# Patient Record
Sex: MALE | Race: BLACK OR AFRICAN AMERICAN | Employment: FULL TIME | URBAN - METROPOLITAN AREA
[De-identification: names, ages, dates, MRNs, and addresses within clinical notes are randomized per-mention and may not be internally consistent; named-entity substitution may affect disease eponyms.]

---

## 2018-03-07 ENCOUNTER — OFFICE VISIT (OUTPATIENT)
Dept: FAMILY MEDICINE CLINIC | Facility: CLINIC | Age: 43
End: 2018-03-07

## 2018-03-07 VITALS
HEART RATE: 78 BPM | OXYGEN SATURATION: 98 % | SYSTOLIC BLOOD PRESSURE: 100 MMHG | TEMPERATURE: 98 F | DIASTOLIC BLOOD PRESSURE: 60 MMHG | RESPIRATION RATE: 16 BRPM

## 2018-03-07 DIAGNOSIS — R20.2 PARESTHESIA: Primary | ICD-10-CM

## 2018-03-07 LAB
GLUCOSE BLOOD: 98
TEST STRIP LOT #: NORMAL NUMERIC

## 2018-03-07 PROCEDURE — 82962 GLUCOSE BLOOD TEST: CPT | Performed by: PHYSICIAN ASSISTANT

## 2018-03-07 PROCEDURE — 99202 OFFICE O/P NEW SF 15 MIN: CPT | Performed by: PHYSICIAN ASSISTANT

## 2018-03-07 NOTE — PROGRESS NOTES
CHIEF COMPLAINT:     Patient presents with:  Paresthesias      HPI:   Siri Granados is a 36year old male who presents with complaints of bilateral numbness in his hands and feet. No weakness or pain.   He says for the past few nights he wakes up and both supple, non-tender  LUNGS: clear to auscultation bilaterally without rale, ronchi, wheeze. CARDIO: S1/S2 without murmur  EXTREMITIES: no cyanosis, clubbing or edema  LYMPH:  no lymphadenopathy.     NEURO:  [id number], Speech fluent, Higher functions normal, CN

## 2018-03-19 ENCOUNTER — OFFICE VISIT (OUTPATIENT)
Dept: FAMILY MEDICINE CLINIC | Facility: CLINIC | Age: 43
End: 2018-03-19

## 2018-03-19 VITALS
DIASTOLIC BLOOD PRESSURE: 60 MMHG | TEMPERATURE: 98 F | WEIGHT: 186 LBS | SYSTOLIC BLOOD PRESSURE: 118 MMHG | HEIGHT: 66 IN | HEART RATE: 57 BPM | OXYGEN SATURATION: 99 % | RESPIRATION RATE: 18 BRPM | BODY MASS INDEX: 29.89 KG/M2

## 2018-03-19 DIAGNOSIS — E78.00 HYPERCHOLESTEREMIA: ICD-10-CM

## 2018-03-19 DIAGNOSIS — L74.512 HYPERHIDROSIS OF PALMS AND SOLES: ICD-10-CM

## 2018-03-19 DIAGNOSIS — L74.513 HYPERHIDROSIS OF PALMS AND SOLES: ICD-10-CM

## 2018-03-19 DIAGNOSIS — R20.2 PARESTHESIAS: Primary | ICD-10-CM

## 2018-03-19 PROCEDURE — 99203 OFFICE O/P NEW LOW 30 MIN: CPT | Performed by: FAMILY MEDICINE

## 2018-03-19 NOTE — PROGRESS NOTES
CHIEF COMPLAINT: Patient presents with:  Establish Care: tingling feet and hands; x1 month    HPI:     Janette Centeno is a 36year old male presents for establish care and discuss tingling in hands and feet x 5–6 weeks; he states the left hand and the feet (Active prior to today's visit):    Current Outpatient Prescriptions:  Multiple Vitamins-Minerals (JORGE MULTI MEN OR) Take by mouth.  Disp:  Rfl:        Allergies:  No Known Allergies    PSFH elements reviewed from today and agreed except as otherwise state VISIT  ASSESSMENT/PLAN:   36year old male with    1. Paresthesias  Mostly at night notes suspect due to position possible carpal tunnel and left hand. - VITAMIN X53; Future  - FOLIC ACID SERUM(FOLATE); Future  - COMP METABOLIC PANEL (14);  Future  - TSH+F

## 2018-03-19 NOTE — PATIENT INSTRUCTIONS
Paraesthesias  Paraesthesia is a burning or prickling sensation that is sometimes felt in the hands, arms, legs or feet. It can also occur in other parts of the body. It can also feel like tingling or numbness, skin crawling, or itching.  The feeling is n Call your healthcare provider right away if any of the following occur:  · Numbness or weakness of the face, one arm, or one leg  · Slurred speech, confusion, trouble speaking, walking, or seeing  · Severe headache, fainting spell, dizziness, or seizure  · · Antiperspirant. An antiperspirant that has 10% to 15% aluminum chloride can block sweat glands and help stop sweating. It comes in creams, sticks, gels, and sprays. You can buy antiperspirant at a drugstore.  Or your healthcare provider may give you a str You may have skin problems in the areas where you sweat. The skin may become moist, pale, swollen, and soft enough to rub away easily. This is known as skin maceration. It can lead to loss of skin, pain, and skin infection.  You can help prevent this proble Don’t just use your thumb and index finger to grasp or lift. This can put stress on your wrist. When you can, use your whole hand and all its fingers to grasp an object.   Minimize repetition  Don’t move your arms or hands or hold an object in the same way Doctors aren’t entirely clear why the condition occurs. Certain things may make a person more likely to have it.  These include:  · Being female  · Being pregnant  · Being overweight  · Having diabetes or rheumatoid arthritis  Symptoms of carpal tunnel synd © 5576-3331 [address]. [address] [facility] [place], [address] [place]. All rights reserved. This information is not intended as a substitute for professional medical care. Always follow your healthcare professional's instructions.         Brandy Mckeon Call your healthcare provider right away if you have any of the following:  · A splint, cast, or dressing that has gotten wet  · Increased bleeding or drainage from the incision (cut)  · Opening of the incision  · Fever of 100.4°F (38°C) or higher, or as d

## 2018-03-20 ENCOUNTER — NURSE ONLY (OUTPATIENT)
Dept: FAMILY MEDICINE CLINIC | Facility: CLINIC | Age: 43
End: 2018-03-20

## 2018-03-20 PROBLEM — E78.00 HYPERCHOLESTEREMIA: Status: ACTIVE | Noted: 2018-03-20

## 2018-03-20 PROCEDURE — 82746 ASSAY OF FOLIC ACID SERUM: CPT | Performed by: FAMILY MEDICINE

## 2018-03-20 PROCEDURE — 84439 ASSAY OF FREE THYROXINE: CPT | Performed by: FAMILY MEDICINE

## 2018-03-20 PROCEDURE — 84443 ASSAY THYROID STIM HORMONE: CPT | Performed by: FAMILY MEDICINE

## 2018-03-20 PROCEDURE — 82607 VITAMIN B-12: CPT | Performed by: FAMILY MEDICINE

## 2018-03-20 PROCEDURE — 80053 COMPREHEN METABOLIC PANEL: CPT | Performed by: FAMILY MEDICINE

## 2018-03-20 PROCEDURE — 36415 COLL VENOUS BLD VENIPUNCTURE: CPT | Performed by: FAMILY MEDICINE

## 2018-03-20 PROCEDURE — 80061 LIPID PANEL: CPT | Performed by: FAMILY MEDICINE

## 2018-04-02 ENCOUNTER — OFFICE VISIT (OUTPATIENT)
Dept: FAMILY MEDICINE CLINIC | Facility: CLINIC | Age: 43
End: 2018-04-02

## 2018-04-02 VITALS
WEIGHT: 184 LBS | HEIGHT: 65.75 IN | SYSTOLIC BLOOD PRESSURE: 110 MMHG | BODY MASS INDEX: 29.93 KG/M2 | DIASTOLIC BLOOD PRESSURE: 62 MMHG | HEART RATE: 64 BPM

## 2018-04-02 DIAGNOSIS — E78.00 HYPERCHOLESTEREMIA: ICD-10-CM

## 2018-04-02 DIAGNOSIS — R20.2 PARESTHESIAS: Primary | ICD-10-CM

## 2018-04-02 PROCEDURE — 99213 OFFICE O/P EST LOW 20 MIN: CPT | Performed by: FAMILY MEDICINE

## 2018-04-02 NOTE — PROGRESS NOTES
CHIEF COMPLAINT: Patient presents with:  Lab Results  Numbness: B/L hands (worse in R hand than L hand, but improved overall - occurs less frequently) & L foot (improved)    HPI:     Alexandra Morel is a 36year old male presents for discuss labs,    States yesterday but rarely eats meat. Mostly vegetables. Moderate beans. Denies any headaches, weakness in the arms or legs, dizziness, speech difficulties, breathing issues, neck or back problems. Denies any pain in this spinal column.   Right hand dominant supple,no adenopathy,no bruits, no JVD  LUNGS: clear to auscultation bilateral. No RRW.  Good inspiratory and expiratory efferot  CARDIO: RRR without murmur, clear S1, S2  VS: no carotid artery or abdominal aorta bruit,    GI: good BS's,no masses,No HSM or awkward position causing numbness that resolves in the morning. No evidence of B12 deficiency. CMP is normal, B12 is normal folate is normal  If symptoms worsen or change will return to clinic  - EXT DME WRIST SPLINT from last visit    2.  Hypercholestere

## 2018-04-02 NOTE — PATIENT INSTRUCTIONS
· Increase omega 3's and alpha linoleic acid (ALA) in diet salmon 2x weekly, walnuts, almonds,flax seed, fatty fish, spinach, titus seeds, grass feed dairy-milk,eggs, rainer lettuce, green beans, shrimp, strawberries and raspberries  · increase exercise to · Cut back on saturated fats and trans fats (also called hydrogenated) by selecting lean cuts of meat, low-fat dairy, and using oils instead of solid fats. Limit baked goods, processed meats, and fried foods.  A diet that’s high in these fats increases your © 1300-1053 [address]. 1407 [facility], [facility] [place]. All rights reserved. This information is not intended as a substitute for professional medical care. Always follow your healthcare professional's instructions.         Lore Arzola Your healthcare provider may recommend that you get more physical activity if you haven't been active. Your provider may recommend that you get moderate to vigorous physical activity for at least 40 minutes each day.  You should do this for at least 3 to 4 Healthy eating and exercise are a good start to keeping your cholesterol down. But you may need some extra help from medicine. If your doctor prescribes medicine, be sure to take it exactly as directed.  Remember:  · Tell your healthcare provider about all If you are in a high-risk group, talk with your healthcare provider about your treatment goals. Make sure you understand why these goals are important, based on your own health history and your family history of heart disease or high cholesterol.   Make a p This handy kitchen appliance cooks food slowly at low temperatures. Set it up in the morning and dinner will be waiting for you when you get home. Soups, stews, and pot roasts all make great crock pot meals. Be sure to trim fat from meat before cooking.  Ex

## 2018-05-12 ENCOUNTER — OFFICE VISIT (OUTPATIENT)
Dept: FAMILY MEDICINE CLINIC | Facility: CLINIC | Age: 43
End: 2018-05-12

## 2018-05-12 VITALS
SYSTOLIC BLOOD PRESSURE: 104 MMHG | WEIGHT: 184 LBS | TEMPERATURE: 98 F | HEIGHT: 67 IN | OXYGEN SATURATION: 99 % | DIASTOLIC BLOOD PRESSURE: 62 MMHG | BODY MASS INDEX: 28.88 KG/M2 | HEART RATE: 97 BPM

## 2018-05-12 DIAGNOSIS — J20.9 ACUTE BRONCHITIS, UNSPECIFIED ORGANISM: Primary | ICD-10-CM

## 2018-05-12 PROCEDURE — 99213 OFFICE O/P EST LOW 20 MIN: CPT | Performed by: PHYSICIAN ASSISTANT

## 2018-05-12 RX ORDER — BENZONATATE 200 MG/1
200 CAPSULE ORAL 3 TIMES DAILY PRN
Qty: 21 CAPSULE | Refills: 0 | Status: SHIPPED | OUTPATIENT
Start: 2018-05-12 | End: 2018-05-19

## 2018-05-12 RX ORDER — METHYLPREDNISOLONE 4 MG/1
TABLET ORAL
Qty: 1 PACKAGE | Refills: 0 | Status: SHIPPED | OUTPATIENT
Start: 2018-05-12 | End: 2018-08-03 | Stop reason: ALTCHOICE

## 2018-05-12 RX ORDER — ALBUTEROL SULFATE 90 UG/1
AEROSOL, METERED RESPIRATORY (INHALATION)
Qty: 1 INHALER | Refills: 0 | Status: SHIPPED | OUTPATIENT
Start: 2018-05-12 | End: 2018-08-03 | Stop reason: ALTCHOICE

## 2018-05-12 NOTE — PATIENT INSTRUCTIONS
1. Medrol- No Motrin like products while on steroid  2. Albuterol  3. Cough suppressant  4. Increase fluids/ rest  5. If fever or worsening symptoms seek treatment  6. Follow up with PCP  Viral Bronchitis (Adult)    You have a viral bronchitis.  Bronchiti · Your appetite may be poor, so a light diet is fine. Avoid dehydration by drinking 6 to 8 glasses of fluids per day (such as water, soft drinks, sports drinks, juices, tea, or soup). Extra fluids will help loosen secretions in the nose and lungs.   · Over-

## 2018-05-12 NOTE — PROGRESS NOTES
CHIEF COMPLAINT:   Patient presents with:  Cough        HPI:   Zohra Newman is a 36year old male who presents for cough for  7  days. Cough is productive and is worse at night when lying down.  Associated symptoms include: Chest congestion, SOB, and whee EYES: EOMI. PERRL. Conjunctiva normal.  Cornea clear. Lid margins normal.  HENT: Atraumatic, normocephalic. TM's clear bilaterally. Nostrils patent, nasal mucosa pink and non-inflamed. No erythema of the throat. NECK: supple, non-tender.   LUNGS: Norm Bronchitis that is caused by a virus is not treated with antibiotics. Instead, medicines may be given to help relieve symptoms. Symptoms can last up to 2 weeks, although the cough may last much longer.   This illness is contagious during the first few days If you are age 72 or older, or if you have a chronic lung disease or condition that affects your immune system, or you smoke, ask your healthcare provider about getting a pneumococcal vaccine and a yearly flu shot (influenza vaccine).   When to seek medical

## 2018-08-03 ENCOUNTER — OFFICE VISIT (OUTPATIENT)
Dept: FAMILY MEDICINE CLINIC | Facility: CLINIC | Age: 43
End: 2018-08-03
Payer: COMMERCIAL

## 2018-08-03 VITALS
BODY MASS INDEX: 28.64 KG/M2 | TEMPERATURE: 98 F | WEIGHT: 174 LBS | SYSTOLIC BLOOD PRESSURE: 108 MMHG | DIASTOLIC BLOOD PRESSURE: 66 MMHG | HEIGHT: 65.25 IN | RESPIRATION RATE: 16 BRPM | HEART RATE: 64 BPM | OXYGEN SATURATION: 97 %

## 2018-08-03 DIAGNOSIS — Z13.21 SCREENING FOR ENDOCRINE, NUTRITIONAL, METABOLIC AND IMMUNITY DISORDER: ICD-10-CM

## 2018-08-03 DIAGNOSIS — Z23 NEED FOR TDAP VACCINATION: ICD-10-CM

## 2018-08-03 DIAGNOSIS — Z13.29 SCREENING FOR ENDOCRINE, NUTRITIONAL, METABOLIC AND IMMUNITY DISORDER: ICD-10-CM

## 2018-08-03 DIAGNOSIS — Z13.228 SCREENING FOR ENDOCRINE, NUTRITIONAL, METABOLIC AND IMMUNITY DISORDER: ICD-10-CM

## 2018-08-03 DIAGNOSIS — Z00.00 ANNUAL PHYSICAL EXAM: Primary | ICD-10-CM

## 2018-08-03 DIAGNOSIS — Z13.0 SCREENING FOR ENDOCRINE, NUTRITIONAL, METABOLIC AND IMMUNITY DISORDER: ICD-10-CM

## 2018-08-03 DIAGNOSIS — Z13.0 SCREENING FOR IRON DEFICIENCY ANEMIA: ICD-10-CM

## 2018-08-03 DIAGNOSIS — E78.00 HYPERCHOLESTEREMIA: ICD-10-CM

## 2018-08-03 LAB
ALBUMIN SERPL-MCNC: 4.3 G/DL (ref 3.5–4.8)
ALBUMIN/GLOB SERPL: 1.3 {RATIO} (ref 1–2)
ALP LIVER SERPL-CCNC: 41 U/L (ref 45–117)
ALT SERPL-CCNC: 31 U/L (ref 17–63)
ANION GAP SERPL CALC-SCNC: 7 MMOL/L (ref 0–18)
AST SERPL-CCNC: 16 U/L (ref 15–41)
BASOPHILS # BLD AUTO: 0.03 X10(3) UL (ref 0–0.1)
BASOPHILS NFR BLD AUTO: 0.9 %
BILIRUB SERPL-MCNC: 1.2 MG/DL (ref 0.1–2)
BUN BLD-MCNC: 10 MG/DL (ref 8–20)
BUN/CREAT SERPL: 8.1 (ref 10–20)
CALCIUM BLD-MCNC: 9.3 MG/DL (ref 8.3–10.3)
CHLORIDE SERPL-SCNC: 106 MMOL/L (ref 101–111)
CHOLEST SMN-MCNC: 227 MG/DL (ref ?–200)
CO2 SERPL-SCNC: 28 MMOL/L (ref 22–32)
CREAT BLD-MCNC: 1.23 MG/DL (ref 0.7–1.3)
EOSINOPHIL # BLD AUTO: 0.03 X10(3) UL (ref 0–0.3)
EOSINOPHIL NFR BLD AUTO: 0.9 %
ERYTHROCYTE [DISTWIDTH] IN BLOOD BY AUTOMATED COUNT: 13.8 % (ref 11.5–16)
GLOBULIN PLAS-MCNC: 3.4 G/DL (ref 2.5–3.7)
GLUCOSE BLD-MCNC: 100 MG/DL (ref 70–99)
HCT VFR BLD AUTO: 43.8 % (ref 37–53)
HDLC SERPL-MCNC: 62 MG/DL (ref 40–59)
HGB BLD-MCNC: 14.5 G/DL (ref 13–17)
IMMATURE GRANULOCYTE COUNT: 0 X10(3) UL (ref 0–1)
IMMATURE GRANULOCYTE RATIO %: 0 %
LDLC SERPL CALC-MCNC: 145 MG/DL (ref ?–100)
LYMPHOCYTES # BLD AUTO: 1.75 X10(3) UL (ref 0.9–4)
LYMPHOCYTES NFR BLD AUTO: 52.4 %
M PROTEIN MFR SERPL ELPH: 7.7 G/DL (ref 6.1–8.3)
MCH RBC QN AUTO: 29.7 PG (ref 27–33.2)
MCHC RBC AUTO-ENTMCNC: 33.1 G/DL (ref 31–37)
MCV RBC AUTO: 89.8 FL (ref 80–99)
MONOCYTES # BLD AUTO: 0.25 X10(3) UL (ref 0.1–1)
MONOCYTES NFR BLD AUTO: 7.5 %
NEUTROPHIL ABS PRELIM: 1.28 X10 (3) UL (ref 1.3–6.7)
NEUTROPHILS # BLD AUTO: 1.28 X10(3) UL (ref 1.3–6.7)
NEUTROPHILS NFR BLD AUTO: 38.3 %
NONHDLC SERPL-MCNC: 165 MG/DL (ref ?–130)
OSMOLALITY SERPL CALC.SUM OF ELEC: 291 MOSM/KG (ref 275–295)
PLATELET # BLD AUTO: 246 10(3)UL (ref 150–450)
POTASSIUM SERPL-SCNC: 4 MMOL/L (ref 3.6–5.1)
RBC # BLD AUTO: 4.88 X10(6)UL (ref 4.3–5.7)
RED CELL DISTRIBUTION WIDTH-SD: 45.3 FL (ref 35.1–46.3)
SODIUM SERPL-SCNC: 141 MMOL/L (ref 136–144)
TRIGL SERPL-MCNC: 98 MG/DL (ref 30–149)
TSI SER-ACNC: 1.03 MIU/ML (ref 0.35–5.5)
VLDLC SERPL CALC-MCNC: 20 MG/DL (ref 0–30)
WBC # BLD AUTO: 3.3 X10(3) UL (ref 4–13)

## 2018-08-03 PROCEDURE — 80050 GENERAL HEALTH PANEL: CPT | Performed by: FAMILY MEDICINE

## 2018-08-03 PROCEDURE — 99396 PREV VISIT EST AGE 40-64: CPT | Performed by: FAMILY MEDICINE

## 2018-08-03 PROCEDURE — 80061 LIPID PANEL: CPT | Performed by: FAMILY MEDICINE

## 2018-08-03 PROCEDURE — 36415 COLL VENOUS BLD VENIPUNCTURE: CPT | Performed by: FAMILY MEDICINE

## 2018-08-03 NOTE — PROGRESS NOTES
Patient presents with:  Physical: fasting labs      REASON FOR VISIT:    Adore Lala is a 36year old male who presents for an [address]     No complaints  , monogamous  On vit D daily + MVI- yes  Calcium- not monitoring  Colonoscop Pneumococcal?: No    Domestic Abuse: No     CAGE:     Cut : No    Annoyed : No    Guilty : No    Eye Opener : No    Scoring  Total Score: 0     Depression Screening (PHQ-2/PHQ-9): Over the LAST 2 WEEKS   Little interest or pleasure in doing things (over th Potassium (mmol/L)   Date Value   03/20/2018 4.2    No flowsheet data found. Creatinine  Annually Creatinine (mg/dL)   Date Value   03/20/2018 1.23    No flowsheet data found.     Digoxin Serum Conc  Annually No results found for: DIGOXIN No flowsheet da pain on exertion  GI: denies abdominal pain, denies heartburn  : denies nocturia or changes in stream  MUSCULOSKELETAL: denies back pain  NEURO: denies headaches  PSYCHE: denies depression or anxiety  HEMATOLOGIC: denies hx of anemia  ENDOCRINE: denies t discussed- CDC recommendation for all  or unmarried couples  - immunizations needs Tdap  -Vitamin D3 2000 units daily recommended  -Needs 1000 mg of calcium daily for osteoporosis prevention discussed  - CBC WITH DIFFERENTIAL WITH PLATELET;  Future

## 2018-08-03 NOTE — PROGRESS NOTES
Patient came in for fasting labs. Patient drawn out of left AC x 1 attempt. Green and lavender tube drawn.

## 2018-08-03 NOTE — PATIENT INSTRUCTIONS
Perform labs fasting 8 hours with water or black coffee or or black tea diet  soda only prior to exam.    -Encourage healthy diet of whole food and avoid processed food and sugary drinks and sodas.   Diet should include lean meats and vegetables including 5 Normal blood pressure is less than 120/80 mm Hg  If your blood pressure is higher than normal, follow the advice of your healthcare provider      Depression All men in this age group At routine exams   Type 2 diabetes or prediabetes All men beginning at ag Haemophilus influenzae Type B (HIB) Men at increased risk for infection – talk with your healthcare provider 1 to 3 doses   Influenza (flu) All men in this age group Once a year   Measles, mumps, rubella (MMR) All men in this age group who have no record o Your body needs calcium to build and repair bones. But it can't make calcium on its own. That's why it's important to eat calcium-rich foods. Some foods are naturally rich in calcium. Others have calcium added (fortified).  It's best to get calcium from the What is this test?  Vitamin D is mainly found in fortified dairy foods, juice, breakfast cereal, and certain fish. This vitamin plays many roles in the body.  But because it helps the body absorb calcium from foods and supplements, it's particularly importa Test results may vary depending on your age, gender, health history, the method used for the test, and other things. Your test results may not mean you have a problem.  Ask your healthcare provider what your test results mean for you.   Children and adults © 8050-0704 [address]. 1407 [facility] [place], [address] [place]. All rights reserved. This information is not intended as a substitute for professional medical care. Always follow your healthcare professional's instructions.         Control · Cut back on saturated fats and trans fats (also called hydrogenated) by selecting lean cuts of meat, low-fat dairy, and using oils instead of solid fats. Limit baked goods, processed meats, and fried foods.  A diet that’s high in these fats increases your © 5754-1616 [address]. 1407 [facility] [place], [facility] [place]. All rights reserved. This information is not intended as a substitute for professional medical care. Always follow your healthcare professional's instructions.

## 2018-08-07 PROBLEM — D70.8 OTHER NEUTROPENIA (HCC): Status: ACTIVE | Noted: 2018-08-07

## 2018-08-07 PROBLEM — D72.810 LYMPHOCYTOPENIA: Status: ACTIVE | Noted: 2018-08-07

## 2018-08-10 ENCOUNTER — TELEPHONE (OUTPATIENT)
Dept: FAMILY MEDICINE CLINIC | Facility: CLINIC | Age: 43
End: 2018-08-10

## 2018-08-10 NOTE — TELEPHONE ENCOUNTER
Spoke with pt, reviewed results and recommendations, pt recently had a respiratory infection and declines further lab work at this time

## 2018-08-10 NOTE — TELEPHONE ENCOUNTER
LMOM to return call to the office. Provided pt office phone [phone] along with office hours given.     Notes recorded by Boogie Jung DO on 8/7/2018 at 9:33 PM CDT  CMP-minimally elevated glucose otherwise normal  CBC-WBC is slightly decreased and

## 2020-11-25 ENCOUNTER — OFFICE VISIT (OUTPATIENT)
Dept: FAMILY MEDICINE CLINIC | Facility: CLINIC | Age: 45
End: 2020-11-25
Payer: COMMERCIAL

## 2020-11-25 VITALS
SYSTOLIC BLOOD PRESSURE: 100 MMHG | RESPIRATION RATE: 18 BRPM | HEIGHT: 66 IN | WEIGHT: 176.81 LBS | TEMPERATURE: 98 F | OXYGEN SATURATION: 100 % | HEART RATE: 74 BPM | BODY MASS INDEX: 28.42 KG/M2 | DIASTOLIC BLOOD PRESSURE: 50 MMHG

## 2020-11-25 DIAGNOSIS — Z12.5 SCREENING FOR PROSTATE CANCER: ICD-10-CM

## 2020-11-25 DIAGNOSIS — Z13.21 SCREENING FOR ENDOCRINE, NUTRITIONAL, METABOLIC AND IMMUNITY DISORDER: ICD-10-CM

## 2020-11-25 DIAGNOSIS — Z00.00 ANNUAL PHYSICAL EXAM: Primary | ICD-10-CM

## 2020-11-25 DIAGNOSIS — Z13.0 SCREENING FOR ENDOCRINE, NUTRITIONAL, METABOLIC AND IMMUNITY DISORDER: ICD-10-CM

## 2020-11-25 DIAGNOSIS — Z13.228 SCREENING FOR ENDOCRINE, NUTRITIONAL, METABOLIC AND IMMUNITY DISORDER: ICD-10-CM

## 2020-11-25 DIAGNOSIS — G89.29 CHRONIC LEFT SHOULDER PAIN: ICD-10-CM

## 2020-11-25 DIAGNOSIS — M25.512 CHRONIC LEFT SHOULDER PAIN: ICD-10-CM

## 2020-11-25 DIAGNOSIS — E78.00 HYPERCHOLESTEREMIA: ICD-10-CM

## 2020-11-25 DIAGNOSIS — Z13.29 SCREENING FOR ENDOCRINE, NUTRITIONAL, METABOLIC AND IMMUNITY DISORDER: ICD-10-CM

## 2020-11-25 DIAGNOSIS — Z13.0 SCREENING FOR IRON DEFICIENCY ANEMIA: ICD-10-CM

## 2020-11-25 PROCEDURE — 3008F BODY MASS INDEX DOCD: CPT | Performed by: FAMILY MEDICINE

## 2020-11-25 PROCEDURE — 3074F SYST BP LT 130 MM HG: CPT | Performed by: FAMILY MEDICINE

## 2020-11-25 PROCEDURE — 99072 ADDL SUPL MATRL&STAF TM PHE: CPT | Performed by: FAMILY MEDICINE

## 2020-11-25 PROCEDURE — 99396 PREV VISIT EST AGE 40-64: CPT | Performed by: FAMILY MEDICINE

## 2020-11-25 PROCEDURE — 3078F DIAST BP <80 MM HG: CPT | Performed by: FAMILY MEDICINE

## 2020-11-25 NOTE — PROGRESS NOTES
Patient presents with:  Physical: not fasting       REASON FOR VISIT:    Adore Lala is a 54year old male who presents for an [address]. Left shoulder in am upon awakening loosens up throughtout he day.  Feels tension anterior shoulder  (H) 03/20/2018     Lab Results   Component Value Date    AST 16 08/03/2018    AST 21 03/20/2018     Lab Results   Component Value Date    ALT 31 08/03/2018    ALT 37 03/20/2018     Lab Results   Component Value Date    TSH 1.030 08/03/2018    TSH 1 with age, risk and gender LDL Cholesterol (mg/dL)   Date Value   08/03/2018 145 (H)       Diabetes Screening  If history of high blood pressure or other  risk factors No results found for: A1C  Glucose (mg/dL)   Date Value   08/03/2018 100 (H)         Josephine file.   No past surgical history on file.    Family History   Problem Relation Age of Onset   • No Known Problems Mother    • No Known Problems Father    • No Known Problems Sister    • No Known Problems Brother    • No Known Problems Brother    • No Known enlargement of prostate no nodules, stool is brown  MUSCULOSKELETAL: back is not tender, FROM of the back.   Left anterior shoulder pain area of tenderness is near [facility] joint superiorly though not appropriate appears well on exam.  Has normal range of motion portion near [facility] joint-complete exercise to evaluate anatomy  Bursitis? Normal range of motion without impingement  Neurovascular intact    4. Screening for prostate cancer  - PSA SCREEN; Future  Risks and benefits of PSA screening discussed.   Uncertain if

## 2020-11-25 NOTE — PATIENT INSTRUCTIONS
As of October 6th 2014, the Drug Enforcement Agency [facility]) is reclassifying all hydrocodone combination medications from Schedule III to Schedule II. This includes medications such as Norco, Vicodin, Lortab, Zohydro, and Vicoprofen.    What this means for yo food and sugary drinks and sodas. Diet should include lean meats and vegetables including 5-7 servings of fruit and vegetables total in 1 day.   Never skip breakfast.  -Encouraged exercise 30 minutes to 60 minutes 5 times daily to prevent obesity and chron heart and lung function  · Reach and maintain a healthy weight  · Make your muscles stronger and more limber so you can stay active  · Prevent falls and fractures by slowing the loss of bone mass (osteoporosis)  · Manage stress better  · Reduce your blood ideas to help you make heart-healthy changes without giving up all the foods and flavors you love. Getting started  · Talk with your healthcare provider about eating plans, such as the DASH or Mediterranean diet. You may also be referred to a dietitian. these foods. Split the other half of your plate between whole grains and lean protein. · Whole grains are high in fiber and rich in vitamins and nutrients. Good choices include whole-wheat bread, pasta, and brown rice.   · Lean proteins give you nutrition [place] [address]. All rights reserved. This information is not intended as a substitute for professional medical care. Always follow your healthcare professional's instructions. What Is Osteoporosis? Osteoporosis is a disease that weakens the bones.  Vira Lee increase everyday safety. Date Last Reviewed: 5/1/2018  © 3275-6404 [employer] [address]. 1407 [facility] [place], [address] [place]. All rights reserved. This information is not intended as a substitute for professional medical care.  Always follow yo Date Last Reviewed: 5/1/2018  © 0454-5326 [employer] [address]. 1407 [facility] [place], [address] [place]. All rights reserved. This information is not intended as a substitute for professional medical care.  Always follow your healthcare Rodolfo Favorite other tests might I have along with this test?  A healthcare provider may also want to check your parathyroid hormone levels and your calcium levels. What do my test results mean?   Test results may vary depending on your age, gender, health history, the supplements you are taking. This includes medicines that don't need a prescription and any illicit drugs you may use. © 0465-1883 [address]. 1407 [facility] [place] [facility] [place], [address] [place] [address]. All rights reserved.  This information is not intended bone fractures and spine changes. It will slow bone loss. Exercise will strengthen your body. It can also be fun. A variety of exercises is best. See below for exercises that can help you.  But before you start, talk with your healthcare provider to be sure

## 2020-12-01 ENCOUNTER — OFFICE VISIT (OUTPATIENT)
Dept: ORTHOPEDICS CLINIC | Facility: CLINIC | Age: 45
End: 2020-12-01
Payer: COMMERCIAL

## 2020-12-01 ENCOUNTER — HOSPITAL ENCOUNTER (OUTPATIENT)
Dept: GENERAL RADIOLOGY | Age: 45
Discharge: HOME | End: 2020-12-01
Attending: ORTHOPAEDIC SURGERY
Payer: COMMERCIAL

## 2020-12-01 VITALS — HEART RATE: 66 BPM | OXYGEN SATURATION: 98 %

## 2020-12-01 DIAGNOSIS — M25.512 ACUTE PAIN OF LEFT SHOULDER: ICD-10-CM

## 2020-12-01 DIAGNOSIS — M25.512 ACUTE PAIN OF LEFT SHOULDER: Primary | ICD-10-CM

## 2020-12-01 PROCEDURE — 73030 X-RAY EXAM OF SHOULDER: CPT | Performed by: ORTHOPAEDIC SURGERY

## 2020-12-01 PROCEDURE — 99203 OFFICE O/P NEW LOW 30 MIN: CPT | Performed by: ORTHOPAEDIC SURGERY

## 2020-12-01 PROCEDURE — 20610 DRAIN/INJ JOINT/BURSA W/O US: CPT | Performed by: ORTHOPAEDIC SURGERY

## 2020-12-01 RX ORDER — TRIAMCINOLONE ACETONIDE 40 MG/ML
40 INJECTION, SUSPENSION INTRA-ARTICULAR; INTRAMUSCULAR ONCE
Status: COMPLETED | OUTPATIENT
Start: 2020-12-01 | End: 2020-12-01

## 2020-12-01 RX ORDER — KETOROLAC TROMETHAMINE 30 MG/ML
30 INJECTION, SOLUTION INTRAMUSCULAR; INTRAVENOUS ONCE
Status: COMPLETED | OUTPATIENT
Start: 2020-12-01 | End: 2020-12-01

## 2020-12-01 RX ADMIN — KETOROLAC TROMETHAMINE 30 MG: 30 INJECTION, SOLUTION INTRAMUSCULAR; INTRAVENOUS at 11:12:00

## 2020-12-01 RX ADMIN — TRIAMCINOLONE ACETONIDE 40 MG: 40 INJECTION, SUSPENSION INTRA-ARTICULAR; INTRAMUSCULAR at 11:12:00

## 2020-12-01 NOTE — PROCEDURES
Left Shoulder Subacromial Space Injection    Name: Andi Sequeira   MRN: [id number]  Date: 12/1/2020     Clinical Indications:   Subacromial impingement with symptoms refractory to conservative measures.      After informed consent, the injection site was mar

## 2020-12-01 NOTE — H&P
[facility] - ORTHOPEDICS  [facility] [address] [address]  [phone]     NEW PATIENT VISIT - HISTORY AND PHYSICAL EXAMINATION     Name: Ryan Perez   MRN: [id number]  Date: 12/1/2020     CC: Left shoulder Endocrine: Negative for cold intolerance and heat intolerance. Genitourinary: Negative for difficulty urinating. Musculoskeletal: Negative for arthralgias, gait problem, joint swelling, myalgias and neck pain. Skin: Negative for rash and wound.    All Atrophy:   none    Scapular winging: Negative    Palpation:     AC Joint  Negative  Biceps Tendon  Negative  Greater Tuberosity Negative    ROM:   Forward Flexion:  full and symmetric  Abduction:   full and symmetric  External Rotation:  full and symmetric I provided him with a subacromial injection with ketorolac and Kenalog, he tolerated the procedure well and noted symptomatic relief immediately. I counseled him to begin activities as tolerated and to follow-up with us on an as-needed basis.   He has no f

## 2020-12-10 ENCOUNTER — TELEPHONE (OUTPATIENT)
Dept: FAMILY MEDICINE CLINIC | Facility: CLINIC | Age: 45
End: 2020-12-10

## 2020-12-10 NOTE — TELEPHONE ENCOUNTER
Received immunization record from [address] [place] [address] to Scan. Per Dr. Loan Stiles:  Needs TDAP- every 10 years. Patient notified via [employer]. Notified also due for flu shot.

## 2021-01-08 ENCOUNTER — TELEPHONE (OUTPATIENT)
Dept: FAMILY MEDICINE CLINIC | Facility: CLINIC | Age: 46
End: 2021-01-08

## 2021-01-08 NOTE — TELEPHONE ENCOUNTER
Spoke to pt. He states he has not gotten his labs done yet. Pt informed he can get his labs done in our office as a nurse visit, or make appt to have done at an [facility] lab. Patient voiced understanding.

## 2021-01-22 ENCOUNTER — NURSE ONLY (OUTPATIENT)
Dept: FAMILY MEDICINE CLINIC | Facility: CLINIC | Age: 46
End: 2021-01-22
Payer: COMMERCIAL

## 2021-01-22 DIAGNOSIS — Z12.5 SCREENING FOR PROSTATE CANCER: ICD-10-CM

## 2021-01-22 DIAGNOSIS — Z13.228 SCREENING FOR ENDOCRINE, NUTRITIONAL, METABOLIC AND IMMUNITY DISORDER: ICD-10-CM

## 2021-01-22 DIAGNOSIS — E78.00 HYPERCHOLESTEREMIA: ICD-10-CM

## 2021-01-22 DIAGNOSIS — Z13.0 SCREENING FOR IRON DEFICIENCY ANEMIA: ICD-10-CM

## 2021-01-22 DIAGNOSIS — Z13.29 SCREENING FOR ENDOCRINE, NUTRITIONAL, METABOLIC AND IMMUNITY DISORDER: ICD-10-CM

## 2021-01-22 DIAGNOSIS — Z00.00 ANNUAL PHYSICAL EXAM: ICD-10-CM

## 2021-01-22 DIAGNOSIS — Z13.21 SCREENING FOR ENDOCRINE, NUTRITIONAL, METABOLIC AND IMMUNITY DISORDER: ICD-10-CM

## 2021-01-22 DIAGNOSIS — Z13.0 SCREENING FOR ENDOCRINE, NUTRITIONAL, METABOLIC AND IMMUNITY DISORDER: ICD-10-CM

## 2021-01-22 LAB
ALBUMIN SERPL-MCNC: 4.3 G/DL (ref 3.4–5)
ALBUMIN/GLOB SERPL: 1.3 {RATIO} (ref 1–2)
ALP LIVER SERPL-CCNC: 52 U/L
ALT SERPL-CCNC: 34 U/L
ANION GAP SERPL CALC-SCNC: 2 MMOL/L (ref 0–18)
AST SERPL-CCNC: 19 U/L (ref 15–37)
BASOPHILS # BLD AUTO: 0.03 X10(3) UL (ref 0–0.2)
BASOPHILS NFR BLD AUTO: 0.8 %
BILIRUB SERPL-MCNC: 1.3 MG/DL (ref 0.1–2)
BUN BLD-MCNC: 16 MG/DL (ref 7–18)
BUN/CREAT SERPL: 10.3 (ref 10–20)
CALCIUM BLD-MCNC: 9.2 MG/DL (ref 8.5–10.1)
CHLORIDE SERPL-SCNC: 108 MMOL/L (ref 98–112)
CHOLEST SMN-MCNC: 285 MG/DL (ref ?–200)
CO2 SERPL-SCNC: 31 MMOL/L (ref 21–32)
COMPLEXED PSA SERPL-MCNC: 2.44 NG/ML (ref ?–4)
CREAT BLD-MCNC: 1.56 MG/DL
DEPRECATED RDW RBC AUTO: 45.1 FL (ref 35.1–46.3)
EOSINOPHIL # BLD AUTO: 0.05 X10(3) UL (ref 0–0.7)
EOSINOPHIL NFR BLD AUTO: 1.3 %
ERYTHROCYTE [DISTWIDTH] IN BLOOD BY AUTOMATED COUNT: 13.3 % (ref 11–15)
GLOBULIN PLAS-MCNC: 3.4 G/DL (ref 2.8–4.4)
GLUCOSE BLD-MCNC: 100 MG/DL (ref 70–99)
HCT VFR BLD AUTO: 44.1 %
HDLC SERPL-MCNC: 65 MG/DL (ref 40–59)
HGB BLD-MCNC: 14.7 G/DL
IMM GRANULOCYTES # BLD AUTO: 0.01 X10(3) UL (ref 0–1)
IMM GRANULOCYTES NFR BLD: 0.3 %
LDLC SERPL CALC-MCNC: 199 MG/DL (ref ?–100)
LYMPHOCYTES # BLD AUTO: 2.19 X10(3) UL (ref 1–4)
LYMPHOCYTES NFR BLD AUTO: 54.8 %
M PROTEIN MFR SERPL ELPH: 7.7 G/DL (ref 6.4–8.2)
MCH RBC QN AUTO: 30.5 PG (ref 26–34)
MCHC RBC AUTO-ENTMCNC: 33.3 G/DL (ref 31–37)
MCV RBC AUTO: 91.5 FL
MONOCYTES # BLD AUTO: 0.32 X10(3) UL (ref 0.1–1)
MONOCYTES NFR BLD AUTO: 8 %
NEUTROPHILS # BLD AUTO: 1.4 X10 (3) UL (ref 1.5–7.7)
NEUTROPHILS # BLD AUTO: 1.4 X10(3) UL (ref 1.5–7.7)
NEUTROPHILS NFR BLD AUTO: 34.8 %
NONHDLC SERPL-MCNC: 220 MG/DL (ref ?–130)
OSMOLALITY SERPL CALC.SUM OF ELEC: 293 MOSM/KG (ref 275–295)
PATIENT FASTING Y/N/NP: YES
PATIENT FASTING Y/N/NP: YES
PLATELET # BLD AUTO: 248 10(3)UL (ref 150–450)
POTASSIUM SERPL-SCNC: 4.4 MMOL/L (ref 3.5–5.1)
RBC # BLD AUTO: 4.82 X10(6)UL
SODIUM SERPL-SCNC: 141 MMOL/L (ref 136–145)
TRIGL SERPL-MCNC: 107 MG/DL (ref 30–149)
TSI SER-ACNC: 1.25 MIU/ML (ref 0.36–3.74)
VLDLC SERPL CALC-MCNC: 21 MG/DL (ref 0–30)
WBC # BLD AUTO: 4 X10(3) UL (ref 4–11)

## 2021-01-22 PROCEDURE — 36415 COLL VENOUS BLD VENIPUNCTURE: CPT | Performed by: FAMILY MEDICINE

## 2021-01-22 PROCEDURE — 80050 GENERAL HEALTH PANEL: CPT | Performed by: FAMILY MEDICINE

## 2021-01-22 PROCEDURE — 84153 ASSAY OF PSA TOTAL: CPT | Performed by: FAMILY MEDICINE

## 2021-01-22 PROCEDURE — 80061 LIPID PANEL: CPT | Performed by: FAMILY MEDICINE

## 2021-01-25 ENCOUNTER — OFFICE VISIT (OUTPATIENT)
Dept: FAMILY MEDICINE CLINIC | Facility: CLINIC | Age: 46
End: 2021-01-25
Payer: COMMERCIAL

## 2021-01-25 VITALS
WEIGHT: 176.81 LBS | HEART RATE: 75 BPM | HEIGHT: 66 IN | SYSTOLIC BLOOD PRESSURE: 118 MMHG | BODY MASS INDEX: 28.42 KG/M2 | OXYGEN SATURATION: 98 % | TEMPERATURE: 98 F | RESPIRATION RATE: 16 BRPM | DIASTOLIC BLOOD PRESSURE: 64 MMHG

## 2021-01-25 DIAGNOSIS — D70.8 OTHER NEUTROPENIA (HCC): ICD-10-CM

## 2021-01-25 DIAGNOSIS — S39.011A STRAIN OF ABDOMINAL MUSCLE, INITIAL ENCOUNTER: Primary | ICD-10-CM

## 2021-01-25 DIAGNOSIS — E78.00 HYPERCHOLESTEREMIA: ICD-10-CM

## 2021-01-25 PROCEDURE — 99214 OFFICE O/P EST MOD 30 MIN: CPT | Performed by: FAMILY MEDICINE

## 2021-01-25 PROCEDURE — 3074F SYST BP LT 130 MM HG: CPT | Performed by: FAMILY MEDICINE

## 2021-01-25 PROCEDURE — 3078F DIAST BP <80 MM HG: CPT | Performed by: FAMILY MEDICINE

## 2021-01-25 PROCEDURE — 3008F BODY MASS INDEX DOCD: CPT | Performed by: FAMILY MEDICINE

## 2021-01-25 RX ORDER — DICLOFENAC SODIUM 75 MG/1
75 TABLET, DELAYED RELEASE ORAL 2 TIMES DAILY
Qty: 20 TABLET | Refills: 0 | Status: SHIPPED | OUTPATIENT
Start: 2021-01-25 | End: 2021-01-27

## 2021-01-25 NOTE — PROGRESS NOTES
CHIEF COMPLAINT: Patient presents with: Other: patient is c/o lower abdominal pain       HPI:     Vincent Almaguer is a 54year old male presents for right lower pelvic wall pain that started 4 weeks ago after a 350 pound dead lift.   Pain was moderate initia • Multiple Vitamins-Minerals (JORGE MULTI MEN OR) 2 scoops in with fruits and vegetables once daily          Allergies:  No Known Allergies    PSFH elements reviewed from today and agreed except as otherwise stated in HPI.   PHYSICAL EXAM:   /64 (BP Lo • Total Protein 01/22/2021 7.7    • Albumin 01/22/2021 4.3    • Globulin  01/22/2021 3.4    • A/G Ratio 01/22/2021 1.3    • FASTING 01/22/2021 Yes    • Cholesterol, Total 01/22/2021 285*   • HDL Cholesterol 01/22/2021 65*   • Triglycerides 01/22/2021 107 No congenital neutropenia given in 2010 was normal  Etiology is unclear but mild suppression is apparent  Repeat CBC in 3-month visit if persists may consider hematology appointment consult    3. Hypercholesteremia  - LIPID PANEL;  Future  The patient's ASC

## 2021-01-26 ENCOUNTER — TELEPHONE (OUTPATIENT)
Dept: FAMILY MEDICINE CLINIC | Facility: CLINIC | Age: 46
End: 2021-01-26

## 2021-01-26 DIAGNOSIS — S39.011A STRAIN OF ABDOMINAL MUSCLE, INITIAL ENCOUNTER: ICD-10-CM

## 2021-01-26 NOTE — TELEPHONE ENCOUNTER
Spoke with pharmacy staff  Sig for diclofenac states that pt should take BID x 21 days, but only 20 pills were sent  Please advise.

## 2021-01-26 NOTE — TELEPHONE ENCOUNTER
Pt called office stating he is having issues with his medication for Inflammation. Pt states the pharmacy has some questions and need to speak to nurse before they give pt his medication.

## 2021-01-27 RX ORDER — DICLOFENAC SODIUM 75 MG/1
75 TABLET, DELAYED RELEASE ORAL 2 TIMES DAILY
Qty: 20 TABLET | Refills: 0 | Status: SHIPPED | OUTPATIENT
Start: 2021-01-27 | End: 2021-02-06

## 2021-01-27 NOTE — TELEPHONE ENCOUNTER
Changed Rx to 10 days. Patient's pain is very mild and may help with inflammation.   Sent new Rx to pharmacy

## 2021-10-15 ENCOUNTER — HOSPITAL ENCOUNTER (OUTPATIENT)
Age: 46
Discharge: HOME | End: 2021-10-15
Payer: COMMERCIAL

## 2021-10-15 VITALS
SYSTOLIC BLOOD PRESSURE: 125 MMHG | HEART RATE: 83 BPM | OXYGEN SATURATION: 99 % | DIASTOLIC BLOOD PRESSURE: 70 MMHG | TEMPERATURE: 100 F | RESPIRATION RATE: 16 BRPM

## 2021-10-15 DIAGNOSIS — Z20.822 ENCOUNTER FOR LABORATORY TESTING FOR COVID-19 VIRUS: Primary | ICD-10-CM

## 2021-10-15 DIAGNOSIS — U07.1 COVID-19: ICD-10-CM

## 2021-10-15 PROCEDURE — 99203 OFFICE O/P NEW LOW 30 MIN: CPT | Performed by: NURSE PRACTITIONER

## 2021-10-15 PROCEDURE — U0002 COVID-19 LAB TEST NON-CDC: HCPCS | Performed by: NURSE PRACTITIONER

## 2021-10-15 NOTE — ED PROVIDER NOTES
Patient Seen in: Immediate [address] [place] [address]      History   Patient presents with:  Loss Of Smell Or Taste  Body ache and/or chills    Stated Complaint: runny nose chills fatigue    Subjective:   41-year-old male presents today with complaints of congestion ru edema present. Mouth/Throat:      Pharynx: Uvula midline. Eyes:      Conjunctiva/sclera: Conjunctivae normal.      Pupils: Pupils are equal, round, and reactive to light. Cardiovascular:      Rate and Rhythm: Normal rate and regular rhythm.    Pulm 1:59 pm    Follow-up:  Dain Portillo DO  [address] [address] [phone]    In 2 weeks  for recheck          Medications Prescribed:  Current Discharge Medication List

## 2021-10-18 ENCOUNTER — TELEPHONE (OUTPATIENT)
Dept: CASE MANAGEMENT | Age: 46
End: 2021-10-18

## 2021-10-19 NOTE — TELEPHONE ENCOUNTER
Patient states he has information given to him from the urgent care about the infusion, he states he wants to read it over and will call me back to schedule. I gave patient my contact information.

## 2021-10-20 ENCOUNTER — TELEPHONE (OUTPATIENT)
Dept: FAMILY MEDICINE CLINIC | Facility: CLINIC | Age: 46
End: 2021-10-20

## 2021-10-20 ENCOUNTER — NURSE ONLY (OUTPATIENT)
Dept: INFUSION CENTER | Age: 46
End: 2021-10-20
Attending: NURSE PRACTITIONER
Payer: COMMERCIAL

## 2021-10-20 VITALS
HEART RATE: 58 BPM | OXYGEN SATURATION: 99 % | TEMPERATURE: 97 F | SYSTOLIC BLOOD PRESSURE: 101 MMHG | RESPIRATION RATE: 21 BRPM | DIASTOLIC BLOOD PRESSURE: 71 MMHG

## 2021-10-20 NOTE — TELEPHONE ENCOUNTER
Pt received PAB infusion at [address] [place] [address] on 10/20 for COVID-19. Please follow-up with pt for post-infusion assessment and home monitoring if needed. Thank you.

## 2021-10-22 ENCOUNTER — TELEMEDICINE (OUTPATIENT)
Dept: FAMILY MEDICINE CLINIC | Facility: CLINIC | Age: 46
End: 2021-10-22
Payer: COMMERCIAL

## 2021-10-22 ENCOUNTER — TELEPHONE (OUTPATIENT)
Dept: FAMILY MEDICINE CLINIC | Facility: CLINIC | Age: 46
End: 2021-10-22

## 2021-10-22 VITALS — WEIGHT: 170 LBS | HEIGHT: 67 IN | BODY MASS INDEX: 26.68 KG/M2

## 2021-10-22 DIAGNOSIS — U07.1 COVID-19: Primary | ICD-10-CM

## 2021-10-22 PROCEDURE — 99213 OFFICE O/P EST LOW 20 MIN: CPT | Performed by: FAMILY MEDICINE

## 2021-10-22 PROCEDURE — 3008F BODY MASS INDEX DOCD: CPT | Performed by: FAMILY MEDICINE

## 2021-10-22 RX ORDER — AMOXICILLIN 500 MG/1
500 CAPSULE ORAL 3 TIMES DAILY
COMMUNITY
Start: 2021-09-07

## 2021-10-22 NOTE — PROGRESS NOTES
Due to COVID-19 ACTION PLAN, the patient's office visit was converted to a video visit.   Time Spent: 15 min    Patient presents with:  Covid: per patient tested for covid on 10/15/2021    Subjective     HPI:   Harry Bo is a 51year old male who presen Patient called back for a status, please call, thanks.    10/15/2021 Detected*             Assessment    Diagnoses and all orders for this visit:    COVID-19  -     SARS-COV-2 BY PCR ([facility]IN[facility]); Future  Doing well.   Patient to remain in isolation in bedroom and terminate tomorrow if minimal to no cough off Shoshana-Se face-to-face examination or emergency care. Patient advised to go to ER or call 911 for worsening symptoms or acute distress. Patient/Caregiver Education: Patient/Caregiver Education: There are no barriers to learning. Medical education done.    Outcome

## 2021-10-22 NOTE — TELEPHONE ENCOUNTER
Pt wants to know if he is consider non contagious at this point due to him testing positive on 10/13/2021.

## 2021-10-22 NOTE — TELEPHONE ENCOUNTER
Spoke to pt and let him know he should be symptom free x 24 hours without use of medication before coming off quarantine  Needs to be on quarantine until tomorrow and would be considered non contagious after tomorrow as long as no symptoms  Patient voiced

## 2021-10-25 ENCOUNTER — LAB ENCOUNTER (OUTPATIENT)
Dept: LAB | Age: 46
End: 2021-10-25
Attending: FAMILY MEDICINE
Payer: COMMERCIAL

## 2021-10-25 DIAGNOSIS — U07.1 COVID-19: ICD-10-CM

## 2021-10-26 ENCOUNTER — TELEPHONE (OUTPATIENT)
Dept: FAMILY MEDICINE CLINIC | Facility: CLINIC | Age: 46
End: 2021-10-26

## 2021-10-26 ENCOUNTER — PATIENT MESSAGE (OUTPATIENT)
Dept: FAMILY MEDICINE CLINIC | Facility: CLINIC | Age: 46
End: 2021-10-26

## 2021-10-26 DIAGNOSIS — Z86.16 HISTORY OF COVID-19: Primary | ICD-10-CM

## 2021-10-26 NOTE — TELEPHONE ENCOUNTER
From: Azam Hogan  To: Dahiana Bean DO  Sent: 10/26/2021 3:21 PM CDT  Subject: Question regarding SARS-COV-2    Hi Dr. Isaac Georges,    I don't have any symptoms however, this test has detected a positive.  When am I eligible for another test?

## 2021-10-27 NOTE — TELEPHONE ENCOUNTER
Spoke to pt, stated he received my-chart message from Dr. Bib White and no longer needs to speak to her.  Will be retesting for Covid  next week

## 2021-10-27 NOTE — TELEPHONE ENCOUNTER
Patient may repeat in 1 week. Patient can test positive for up to 1 month after symptoms have resolved but are not contagious.   This is part of the reason why CDC does not recommend testing after active infection  Patient needs for employer to return to w

## 2021-10-27 NOTE — TELEPHONE ENCOUNTER
Spoke to pt, pt  wanted to know when it was ok to not use mask at home around wife. Pt past 10 days of diagnosis. Pt informed should also be symptom free and afebrile for 24 hours before returning to work, or regular activities.  Pt verbalized understanding

## 2021-11-01 ENCOUNTER — LAB ENCOUNTER (OUTPATIENT)
Dept: LAB | Age: 46
End: 2021-11-01
Attending: FAMILY MEDICINE
Payer: COMMERCIAL

## 2021-11-01 DIAGNOSIS — Z86.16 HISTORY OF COVID-19: ICD-10-CM

## 2021-11-02 ENCOUNTER — PATIENT MESSAGE (OUTPATIENT)
Dept: FAMILY MEDICINE CLINIC | Facility: CLINIC | Age: 46
End: 2021-11-02

## 2021-11-03 DIAGNOSIS — Z86.16 HISTORY OF COVID-19: Primary | ICD-10-CM

## 2021-11-03 NOTE — TELEPHONE ENCOUNTER
From: Alexandra Morel  To: Raúl Montana DO  Sent: 11/2/2021 5:52 PM CDT  Subject: Question regarding SARS-COV-2    Hi Dr. Angelina Sebastian,    Can you write a letter for my travel on Friday?

## 2021-11-03 NOTE — TELEPHONE ENCOUNTER
Pt to travel out of the country on Friday, denies any sick symptoms at this time. Letter for travel gigi due to recent Covid infection 10/15/21. Pt to  copy in office once ready.

## 2021-11-03 NOTE — TELEPHONE ENCOUNTER
Please clarify l with patient isai for travel? I am uncertain of context and why I am writing the letter? If this is clearance to travel, please clarify he remains asymptomatic with Covid symptoms. He is greater than 10 days since diagnosis.     Thank

## 2022-04-18 ENCOUNTER — OFFICE VISIT (OUTPATIENT)
Dept: FAMILY MEDICINE CLINIC | Facility: CLINIC | Age: 47
End: 2022-04-18
Payer: COMMERCIAL

## 2022-04-18 VITALS
HEIGHT: 66.75 IN | SYSTOLIC BLOOD PRESSURE: 104 MMHG | DIASTOLIC BLOOD PRESSURE: 60 MMHG | OXYGEN SATURATION: 98 % | HEART RATE: 56 BPM | TEMPERATURE: 97 F | WEIGHT: 179.63 LBS | RESPIRATION RATE: 14 BRPM | BODY MASS INDEX: 28.19 KG/M2

## 2022-04-18 DIAGNOSIS — E78.00 HYPERCHOLESTEREMIA: ICD-10-CM

## 2022-04-18 DIAGNOSIS — D70.8 OTHER NEUTROPENIA (HCC): ICD-10-CM

## 2022-04-18 DIAGNOSIS — Z23 NEED FOR VACCINATION: ICD-10-CM

## 2022-04-18 DIAGNOSIS — Z00.00 ANNUAL PHYSICAL EXAM: Primary | ICD-10-CM

## 2022-04-18 DIAGNOSIS — Z13.228 SCREENING FOR ENDOCRINE, NUTRITIONAL, METABOLIC AND IMMUNITY DISORDER: ICD-10-CM

## 2022-04-18 DIAGNOSIS — D72.810 LYMPHOCYTOPENIA: ICD-10-CM

## 2022-04-18 DIAGNOSIS — Z13.29 SCREENING FOR ENDOCRINE, NUTRITIONAL, METABOLIC AND IMMUNITY DISORDER: ICD-10-CM

## 2022-04-18 DIAGNOSIS — M25.511 ACUTE PAIN OF RIGHT SHOULDER: ICD-10-CM

## 2022-04-18 DIAGNOSIS — Z13.21 SCREENING FOR ENDOCRINE, NUTRITIONAL, METABOLIC AND IMMUNITY DISORDER: ICD-10-CM

## 2022-04-18 DIAGNOSIS — Z13.0 SCREENING FOR ENDOCRINE, NUTRITIONAL, METABOLIC AND IMMUNITY DISORDER: ICD-10-CM

## 2022-04-18 DIAGNOSIS — Z12.11 SCREEN FOR COLON CANCER: ICD-10-CM

## 2022-04-18 PROBLEM — R20.2 PARESTHESIAS: Status: RESOLVED | Noted: 2018-03-19 | Resolved: 2022-04-18

## 2022-04-18 PROCEDURE — 90715 TDAP VACCINE 7 YRS/> IM: CPT | Performed by: FAMILY MEDICINE

## 2022-04-18 PROCEDURE — 3078F DIAST BP <80 MM HG: CPT | Performed by: FAMILY MEDICINE

## 2022-04-18 PROCEDURE — 90732 PPSV23 VACC 2 YRS+ SUBQ/IM: CPT | Performed by: FAMILY MEDICINE

## 2022-04-18 PROCEDURE — 90472 IMMUNIZATION ADMIN EACH ADD: CPT | Performed by: FAMILY MEDICINE

## 2022-04-18 PROCEDURE — 99396 PREV VISIT EST AGE 40-64: CPT | Performed by: FAMILY MEDICINE

## 2022-04-18 PROCEDURE — 3008F BODY MASS INDEX DOCD: CPT | Performed by: FAMILY MEDICINE

## 2022-04-18 PROCEDURE — 90471 IMMUNIZATION ADMIN: CPT | Performed by: FAMILY MEDICINE

## 2022-04-18 PROCEDURE — 3074F SYST BP LT 130 MM HG: CPT | Performed by: FAMILY MEDICINE

## 2022-04-18 RX ORDER — CHLORAL HYDRATE 500 MG
1000 CAPSULE ORAL DAILY
COMMUNITY

## 2022-04-18 RX ORDER — BIOTIN 1 MG
1 TABLET ORAL DAILY
COMMUNITY

## 2022-04-18 RX ORDER — CELECOXIB 200 MG/1
200 CAPSULE ORAL 2 TIMES DAILY
Qty: 20 CAPSULE | Refills: 0 | Status: SHIPPED | OUTPATIENT
Start: 2022-04-18

## 2023-10-20 ENCOUNTER — PATIENT MESSAGE (OUTPATIENT)
Dept: FAMILY MEDICINE CLINIC | Facility: CLINIC | Age: 48
End: 2023-10-20

## 2023-10-20 ENCOUNTER — OFFICE VISIT (OUTPATIENT)
Dept: FAMILY MEDICINE CLINIC | Facility: CLINIC | Age: 48
End: 2023-10-20
Payer: COMMERCIAL

## 2023-10-20 VITALS
OXYGEN SATURATION: 97 % | BODY MASS INDEX: 27.74 KG/M2 | HEART RATE: 78 BPM | DIASTOLIC BLOOD PRESSURE: 66 MMHG | TEMPERATURE: 98 F | WEIGHT: 172.63 LBS | RESPIRATION RATE: 16 BRPM | HEIGHT: 66 IN | SYSTOLIC BLOOD PRESSURE: 90 MMHG

## 2023-10-20 DIAGNOSIS — Z23 NEED FOR VACCINATION: ICD-10-CM

## 2023-10-20 DIAGNOSIS — D70.8 OTHER NEUTROPENIA (HCC): ICD-10-CM

## 2023-10-20 DIAGNOSIS — Z12.5 SCREENING FOR MALIGNANT NEOPLASM OF PROSTATE: ICD-10-CM

## 2023-10-20 DIAGNOSIS — E78.00 HYPERCHOLESTEREMIA: ICD-10-CM

## 2023-10-20 DIAGNOSIS — M54.16 RIGHT LUMBAR RADICULOPATHY: ICD-10-CM

## 2023-10-20 DIAGNOSIS — Z00.00 ANNUAL PHYSICAL EXAM: Primary | ICD-10-CM

## 2023-10-20 DIAGNOSIS — Z12.11 SCREEN FOR COLON CANCER: ICD-10-CM

## 2023-10-20 DIAGNOSIS — M79.645 FINGER PAIN, LEFT: ICD-10-CM

## 2023-10-20 PROBLEM — M25.512 ACUTE PAIN OF LEFT SHOULDER: Status: RESOLVED | Noted: 2020-12-01 | Resolved: 2023-10-20

## 2023-10-20 PROBLEM — M25.511 ACUTE PAIN OF RIGHT SHOULDER: Status: RESOLVED | Noted: 2022-04-18 | Resolved: 2023-10-20

## 2023-10-20 PROBLEM — U07.1 COVID-19: Status: RESOLVED | Noted: 2021-10-22 | Resolved: 2023-10-20

## 2023-10-20 LAB
ALBUMIN SERPL-MCNC: 3.8 G/DL (ref 3.4–5)
ALBUMIN/GLOB SERPL: 1.1 {RATIO} (ref 1–2)
ALP LIVER SERPL-CCNC: 46 U/L
ALT SERPL-CCNC: 81 U/L
ANION GAP SERPL CALC-SCNC: 4 MMOL/L (ref 0–18)
AST SERPL-CCNC: 34 U/L (ref 15–37)
BASOPHILS # BLD AUTO: 0.02 X10(3) UL (ref 0–0.2)
BASOPHILS NFR BLD AUTO: 0.5 %
BILIRUB SERPL-MCNC: 1.1 MG/DL (ref 0.1–2)
BUN BLD-MCNC: 18 MG/DL (ref 7–18)
CALCIUM BLD-MCNC: 9.3 MG/DL (ref 8.5–10.1)
CHLORIDE SERPL-SCNC: 104 MMOL/L (ref 98–112)
CHOLEST SERPL-MCNC: 253 MG/DL (ref ?–200)
CO2 SERPL-SCNC: 30 MMOL/L (ref 21–32)
COMPLEXED PSA SERPL-MCNC: 2.75 NG/ML (ref ?–4)
CREAT BLD-MCNC: 1.48 MG/DL
EGFRCR SERPLBLD CKD-EPI 2021: 58 ML/MIN/1.73M2 (ref 60–?)
EOSINOPHIL # BLD AUTO: 0.07 X10(3) UL (ref 0–0.7)
EOSINOPHIL NFR BLD AUTO: 1.8 %
ERYTHROCYTE [DISTWIDTH] IN BLOOD BY AUTOMATED COUNT: 12.7 %
ERYTHROCYTE [SEDIMENTATION RATE] IN BLOOD: 6 MM/HR
EST. AVERAGE GLUCOSE BLD GHB EST-MCNC: 114 MG/DL (ref 68–126)
FASTING PATIENT LIPID ANSWER: YES
FASTING STATUS PATIENT QL REPORTED: YES
GLOBULIN PLAS-MCNC: 3.5 G/DL (ref 2.8–4.4)
GLUCOSE BLD-MCNC: 92 MG/DL (ref 70–99)
HBA1C MFR BLD: 5.6 % (ref ?–5.7)
HCT VFR BLD AUTO: 45.4 %
HDLC SERPL-MCNC: 64 MG/DL (ref 40–59)
HGB BLD-MCNC: 15.6 G/DL
IMM GRANULOCYTES # BLD AUTO: 0.01 X10(3) UL (ref 0–1)
IMM GRANULOCYTES NFR BLD: 0.3 %
LDLC SERPL CALC-MCNC: 164 MG/DL (ref ?–100)
LYMPHOCYTES # BLD AUTO: 2.12 X10(3) UL (ref 1–4)
LYMPHOCYTES NFR BLD AUTO: 54.8 %
MCH RBC QN AUTO: 30.6 PG (ref 26–34)
MCHC RBC AUTO-ENTMCNC: 34.4 G/DL (ref 31–37)
MCV RBC AUTO: 89.2 FL
MONOCYTES # BLD AUTO: 0.25 X10(3) UL (ref 0.1–1)
MONOCYTES NFR BLD AUTO: 6.5 %
NEUTROPHILS # BLD AUTO: 1.4 X10 (3) UL (ref 1.5–7.7)
NEUTROPHILS # BLD AUTO: 1.4 X10(3) UL (ref 1.5–7.7)
NEUTROPHILS NFR BLD AUTO: 36.1 %
NONHDLC SERPL-MCNC: 189 MG/DL (ref ?–130)
OSMOLALITY SERPL CALC.SUM OF ELEC: 288 MOSM/KG (ref 275–295)
PLATELET # BLD AUTO: 258 10(3)UL (ref 150–450)
POTASSIUM SERPL-SCNC: 4.6 MMOL/L (ref 3.5–5.1)
PROT SERPL-MCNC: 7.3 G/DL (ref 6.4–8.2)
RBC # BLD AUTO: 5.09 X10(6)UL
RHEUMATOID FACT SERPL-ACNC: <10 IU/ML (ref ?–15)
SODIUM SERPL-SCNC: 138 MMOL/L (ref 136–145)
TRIGL SERPL-MCNC: 139 MG/DL (ref 30–149)
TSI SER-ACNC: 1.1 MIU/ML (ref 0.36–3.74)
VLDLC SERPL CALC-MCNC: 27 MG/DL (ref 0–30)
WBC # BLD AUTO: 3.9 X10(3) UL (ref 4–11)

## 2023-10-20 PROCEDURE — 99396 PREV VISIT EST AGE 40-64: CPT | Performed by: FAMILY MEDICINE

## 2023-10-20 PROCEDURE — 86431 RHEUMATOID FACTOR QUANT: CPT | Performed by: FAMILY MEDICINE

## 2023-10-20 PROCEDURE — 80050 GENERAL HEALTH PANEL: CPT | Performed by: FAMILY MEDICINE

## 2023-10-20 PROCEDURE — 80061 LIPID PANEL: CPT | Performed by: FAMILY MEDICINE

## 2023-10-20 PROCEDURE — 84153 ASSAY OF PSA TOTAL: CPT | Performed by: FAMILY MEDICINE

## 2023-10-20 PROCEDURE — 81374 HLA I TYPING 1 ANTIGEN LR: CPT | Performed by: FAMILY MEDICINE

## 2023-10-20 PROCEDURE — 3078F DIAST BP <80 MM HG: CPT | Performed by: FAMILY MEDICINE

## 2023-10-20 PROCEDURE — 83036 HEMOGLOBIN GLYCOSYLATED A1C: CPT | Performed by: FAMILY MEDICINE

## 2023-10-20 PROCEDURE — 3074F SYST BP LT 130 MM HG: CPT | Performed by: FAMILY MEDICINE

## 2023-10-20 PROCEDURE — 3008F BODY MASS INDEX DOCD: CPT | Performed by: FAMILY MEDICINE

## 2023-10-20 PROCEDURE — 85652 RBC SED RATE AUTOMATED: CPT | Performed by: FAMILY MEDICINE

## 2023-10-22 DIAGNOSIS — R74.01 ELEVATED ALT MEASUREMENT: Primary | ICD-10-CM

## 2023-10-22 PROBLEM — S39.011A STRAIN OF ABDOMINAL MUSCLE: Status: RESOLVED | Noted: 2021-01-26 | Resolved: 2023-10-22

## 2023-10-23 NOTE — TELEPHONE ENCOUNTER
Patient's WBC count is mildly elevated and neutrophils this just can indicate that patient could be fighting off an infection. This happened with a prior CBC and is not concerning.   Please inform

## 2023-10-23 NOTE — PROGRESS NOTES
Stacy notified:  See my chart message to patient. The patient's ASCVD 10 year risk score is 2.7. Dear Bib Petit,  Your labs are normal except your lipid panel, liver enzymes/ALT and creatinine are elevated. A1c is also borderline prediabetic at 5.6.  5.7 is prediabetic. Treatment for prediabetes is a low-carb diet less than 100 g daily, following a Mediterranean Whole Foods diet, exercising 30 to 60 minutes 5 days a week. Recommend monitoring A1c in 1 year. Liver enzyme ALT elevation-etiology is unclear. I would like to repeat your liver enzymes in 6 to 8 weeks and if it is still elevated then further work-up is indicated. Rheumatoid arthritis and inflammatory markers are negative. HLA-B27 is still pending. 10-year risk of heart attack per ASCVD score is 2.7, which is low. Treatment for mild to moderate abnormal cholesterol and lipids is best managed  initially with lifestyle changes, improving diet and increasing physical activity. Recommendations for exercise are 3-5 times weekly for 30-60 minutes for a minimum of 150-300 minutes. This will improve your cholesterol levels and strengthen your heart. If you are overweight, then losing weight may also improve your lipid profile. The Mediterranean diet is recommended and contains foods high in omega-3's and alpha linoleic acid; this helps improve your good cholesterol and may lower bad cholesterol. Eat salmon 2x weekly, consume moderate vegetables,lean meats/fish, nuts and healthy fats i.e. almonds, walnuts, flaxseed, hempseed, avocados, avocado or olive oil, spinach, green beans, organic strawberries, etc. Please avoid fried food or limit to 1 x monthly along with pizza and other foods that are high in saturated animal fats. Monitoring your cholesterol and lipid profile is recommended annually sooner as as directed by your doctor.   Please call our office  if you have any further question or schedule an appointment.     Sincerely,  Justin Thornton

## 2023-10-23 NOTE — TELEPHONE ENCOUNTER
Pt is asking about CBC w/ dif results. Note from PCP on results does not mention CBC. Please advise.

## 2023-10-23 NOTE — TELEPHONE ENCOUNTER
From: Yung Galicia  To: Gavi Cornelius  Sent: 10/20/2023 3:52 PM CDT  Subject: Question regarding Lazara Hernandez,    What does all these mean with my results?

## 2023-10-25 LAB — HLA-B27: POSITIVE

## 2023-10-29 DIAGNOSIS — Z15.89 HLA B27 (HLA B27 POSITIVE): Primary | ICD-10-CM

## 2023-10-29 DIAGNOSIS — M54.16 RIGHT LUMBAR RADICULOPATHY: ICD-10-CM

## 2023-12-19 ENCOUNTER — OFFICE VISIT (OUTPATIENT)
Dept: FAMILY MEDICINE CLINIC | Facility: CLINIC | Age: 48
End: 2023-12-19
Payer: COMMERCIAL

## 2023-12-19 VITALS
BODY MASS INDEX: 27 KG/M2 | DIASTOLIC BLOOD PRESSURE: 54 MMHG | RESPIRATION RATE: 18 BRPM | SYSTOLIC BLOOD PRESSURE: 112 MMHG | HEART RATE: 57 BPM | OXYGEN SATURATION: 98 % | TEMPERATURE: 98 F | WEIGHT: 167 LBS

## 2023-12-19 DIAGNOSIS — Z23 NEED FOR VACCINATION: ICD-10-CM

## 2023-12-19 DIAGNOSIS — Z15.89 HLA B27 (HLA B27 POSITIVE): ICD-10-CM

## 2023-12-19 DIAGNOSIS — Z87.39 HISTORY OF BACK PAIN: ICD-10-CM

## 2023-12-19 DIAGNOSIS — E78.00 HYPERCHOLESTEREMIA: ICD-10-CM

## 2023-12-19 DIAGNOSIS — R74.01 ELEVATED ALT MEASUREMENT: Primary | Chronic | ICD-10-CM

## 2023-12-19 LAB
ALBUMIN SERPL-MCNC: 4.1 G/DL (ref 3.4–5)
ALP LIVER SERPL-CCNC: 48 U/L
ALT SERPL-CCNC: 36 U/L
AST SERPL-CCNC: 19 U/L (ref 15–37)
BILIRUB DIRECT SERPL-MCNC: 0.2 MG/DL (ref 0–0.2)
BILIRUB SERPL-MCNC: 1.2 MG/DL (ref 0.1–2)
PROT SERPL-MCNC: 7.5 G/DL (ref 6.4–8.2)

## 2023-12-19 PROCEDURE — 99214 OFFICE O/P EST MOD 30 MIN: CPT | Performed by: FAMILY MEDICINE

## 2023-12-19 PROCEDURE — 3074F SYST BP LT 130 MM HG: CPT | Performed by: FAMILY MEDICINE

## 2023-12-19 PROCEDURE — 80076 HEPATIC FUNCTION PANEL: CPT | Performed by: FAMILY MEDICINE

## 2023-12-19 PROCEDURE — 3078F DIAST BP <80 MM HG: CPT | Performed by: FAMILY MEDICINE

## 2023-12-19 NOTE — PROGRESS NOTES
Patient came in for draw of ordered fasting labs. Patient drawn out of right arm  AC, x 1 attempt and tolerated well.  1 SST ( green) tube drawn.

## 2024-02-12 ENCOUNTER — OFFICE VISIT (OUTPATIENT)
Dept: RHEUMATOLOGY | Facility: CLINIC | Age: 49
End: 2024-02-12
Payer: COMMERCIAL

## 2024-02-12 VITALS
HEIGHT: 66 IN | TEMPERATURE: 98 F | DIASTOLIC BLOOD PRESSURE: 62 MMHG | SYSTOLIC BLOOD PRESSURE: 96 MMHG | WEIGHT: 167.81 LBS | HEART RATE: 71 BPM | BODY MASS INDEX: 26.97 KG/M2 | RESPIRATION RATE: 16 BRPM | OXYGEN SATURATION: 99 %

## 2024-02-12 DIAGNOSIS — G89.29 CHRONIC RIGHT-SIDED LOW BACK PAIN WITH RIGHT-SIDED SCIATICA: ICD-10-CM

## 2024-02-12 DIAGNOSIS — M79.671 RIGHT FOOT PAIN: ICD-10-CM

## 2024-02-12 DIAGNOSIS — M54.41 CHRONIC RIGHT-SIDED LOW BACK PAIN WITH RIGHT-SIDED SCIATICA: ICD-10-CM

## 2024-02-12 DIAGNOSIS — Z15.89 HLA B27 (HLA B27 POSITIVE): Primary | ICD-10-CM

## 2024-02-12 PROCEDURE — 99244 OFF/OP CNSLTJ NEW/EST MOD 40: CPT | Performed by: INTERNAL MEDICINE

## 2024-02-12 PROCEDURE — 3078F DIAST BP <80 MM HG: CPT | Performed by: INTERNAL MEDICINE

## 2024-02-12 PROCEDURE — 3008F BODY MASS INDEX DOCD: CPT | Performed by: INTERNAL MEDICINE

## 2024-02-12 PROCEDURE — 3074F SYST BP LT 130 MM HG: CPT | Performed by: INTERNAL MEDICINE

## 2024-02-12 NOTE — PROGRESS NOTES
Rheumatology New Patient Note  =====================================================================================================    Date of visit: 2/12/2024  ?  Chief complaint: HLA-B27 positive  Chief Complaint   Patient presents with    New Patient     New patient with c/o multiple joint pain.      Referring (will send letter)  PCP  Debbie Sharp DO  Fax: [phone]  Phone: [phone]    =====================================================================================================  HPI    Bo Claudio is a 48 year old male     Here for evaluation of a positive HLA-B27.   Shoulder/knee/elbow for quite some time, for years. Also had back pain, attributed lumbar radiculopathy. In October, 2023. R hip X-rays were normal.  Eventually saw [facility].  Notes and imaging results not in the system for my review.  Reportedly MRI of the lumbar spine showed bulging disc in the lower lumbar regions, L3/L4.  Had significant sensory decline in the proximal right thigh.  Diagnosed with sciatica/lumbar radiculopathy.  Did physical therapy and doing much better.  -In early 20s, had one other episode of back pain.  Otherwise has no back pain usually.  -very active: karate, basketball, ran track.   -Stopped heavy weights. Cleaned up diet and back on Mediterranean diet now    No uveitis or BRBPR or diarrhea.  Has kids who do not have any autoimmune disease    Fhx: no autoimmune disease    14 point ROS negative except noted above    Medications:  Current Outpatient Medications on File Prior to Visit   Medication Sig Dispense Refill    Calcium Carbonate Antacid 1000 MG Oral Chew Tab Chew 1,000 mg by mouth daily.      omega-3 fatty acids 1000 MG Oral Cap Take 900 mg by mouth daily.      Cholecalciferol (VITAMIN D3) 25 MCG (1000 UT) Oral Cap Take 1 tablet by mouth daily.      Multiple Vitamins-Minerals (JORGE MULTI MEN OR) 2 scoops in with fruits and vegetables once daily       PEG 3350-KCl-Na Bicarb-NaCl 420 g  Oral Recon Soln Take as directed by physician (Patient not taking: Reported on 2/12/2024) 4000 mL 0     No current facility-administered medications on file prior to visit.       Past Medical History:  Past Medical History:   Diagnosis Date    Arthritis     Bloating     Decorative tattoo     Paresthesias 03/19/2018    3/2018 bilateral hands and left foot 4/2/2018-changed sleep position to back and not putting arms overhead and almost completely resolved except for some tingling in the right hand.  He notices this upon awakening and resolves in a few minutes after grasping a few time.  Not using wrist splints.  Ice weakness    Wears glasses      Past Surgical History:  Past Surgical History:   Procedure Laterality Date    VASECTOMY      2009     Family History:  Family History   Problem Relation Age of Onset    No Known Problems Father     No Known Problems Mother     No Known Problems Son     No Known Problems Son     Heart Attack Maternal Grandmother     Stroke Maternal Grandmother     Diabetes Maternal Grandfather     No Known Problems Sister     No Known Problems Brother     No Known Problems Brother      Social History:  Social History     Socioeconomic History    Marital status:    Tobacco Use    Smoking status: Former     Years: 1     Types: Cigarettes    Smokeless tobacco: Never    Tobacco comments:     Occasional cigar   Vaping Use    Vaping Use: Never used   Substance and Sexual Activity    Alcohol use: Yes     Alcohol/week: 3.0 standard drinks of alcohol     Types: 2 Glasses of wine, 1 Shots of liquor per week     Comment: special occasions    Drug use: Never   Other Topics Concern    Caffeine Concern No    Stress Concern No    Weight Concern No    Special Diet No    Exercise Yes    Seat Belt Yes     ?  Allergies:  No Known Allergies      Objective    Vitals:    02/12/24 0801   BP: 96/62   Pulse: 71   Resp: 16   Temp: 97.9 °F (36.6 °C)   SpO2: 99%   Weight: 167 lb 12.8 oz (76.1 kg)   Height: 5' 6\"  (1.676 m)       GEN: NAD, well-nourished.   HEENT: Head: NCAT. Face: No lesions. Eyes: Conjunctiva clear. Sclera are anicteric. PERRLA. EOMs are full. Ears: The right and left ear canals are clear.  Nose: No external or internal nasal deformities. Nasal septum is midline. Mouth: The lips are within normal limits.  No oral ulcers Tongue is midline with no lesions. The oral cavity is clear.   Neck: Supple. No neck masses. No thyromegaly.   PULM:easy effort  Extremities: No cyanosis, edema or deformities.   Neurologic: Strength, CN2-12 grossly intact   Psych: normal affect.   Skin: No lesions or rashes.  MSK: 28 joint count performed. No evidence of synovitis in mcp, pip, dip, wrist, elbows, shoulders, hips, knees, ankles, mtp unless otherwise noted. Full ROM of elbows, wrists, knees.     Tender to palpation overlying right lateral foot just outside right MTP 5  -No peripheral joint synovitis  -No SI joint tenderness  -Negative Joanna's, thigh thrust    Labs:    10/2023  RF negative  HLA-B27 positive  Sed rate 6  TSH WNL    Lab Results   Component Value Date    WBC 3.9 (L) 10/20/2023    RBC 5.09 10/20/2023    HGB 15.6 10/20/2023    HCT 45.4 10/20/2023    .0 10/20/2023    MCV 89.2 10/20/2023    MCH 30.6 10/20/2023    MCHC 34.4 10/20/2023    RDW 12.7 10/20/2023    NEPRELIM 1.40 (L) 10/20/2023    NEPERCENT 36.1 10/20/2023    LYPERCENT 54.8 10/20/2023    MOPERCENT 6.5 10/20/2023    EOPERCENT 1.8 10/20/2023    BAPERCENT 0.5 10/20/2023    NE 1.40 (L) 10/20/2023    LYMABS 2.12 10/20/2023    MOABSO 0.25 10/20/2023    EOABSO 0.07 10/20/2023    BAABSO 0.02 10/20/2023     Lab Results   Component Value Date    GLU 92 10/20/2023    BUN 18 10/20/2023    BUNCREA 10.3 01/22/2021    CREATSERUM 1.48 (H) 10/20/2023    ANIONGAP 4 10/20/2023    GFRNAA 52 (L) 01/22/2021    GFRAA 61 01/22/2021    CA 9.3 10/20/2023    OSMOCALC 288 10/20/2023    ALKPHO 48 12/19/2023    AST 19 12/19/2023    ALT 36 12/19/2023    BILT 1.2 12/19/2023    TP  7.5 12/19/2023    ALB 4.1 12/19/2023    GLOBULIN 3.5 10/20/2023     10/20/2023    K 4.6 10/20/2023     10/20/2023    CO2 30.0 10/20/2023         No results found for: \"ANATI\", \"JAMIE\", \"ANAS\", \"ANASCRN\", \"ANASCRNRFLX\", \"SAUL\"  No results found for: \"SSA\", \"SSAUR\", \"ANTISSA\", \"SSA52\", \"SSA60\", \"SSADD\", \"SSB\", \"ANTISSB\"  No results found for: \"DSDNA\", \"ANTIDSDNA\", \"SMUD\", \"ANTISM\", \"SM\", \"RNP\", \"ANTIRNP\", \"SMITHRNP\"  No results found for: \"SCL70\", \"SCL\", \"UFUCAZP84\"  No results found for: \"C3\", \"C4\"  No results found for: \"DRVVT\", \"LAINT\", \"PTTLUPUS\", \"LUPUSINTERP\", \"LA\", \"W8LD0MRRLH\", \"L2HW2KZHAE\", \"K8LXPWHNXV\", \"F1LILJFQLY\"  No results found for: \"CARDIOLIPIGG\", \"CARDIOLIPIGM\", \"CARDIOLIPIGA\", \"CARDIOIGA\", \"CARLIP\"      Additional Labs:    Radiology:    Radiology review:      =====================================================================================================  Assessment and Plan    Assessment:  1. HLA B27 (HLA B27 positive)    2. Chronic right-sided low back pain with right-sided sciatica    3. Right foot pain      Positive HLA-B27 without clinical features that would be consistent with axial spondylarthritis, uveitis, inflammatory skin disease such as psoriasis, or inflammatory bowel disease.  Discussed with the patient that 10% of the [place]  population may have this particular gene.  Most will not have any particular inflammatory condition results from carrying this gene.    Patient's low back pain suffered in October 2023 was acute, appears more mechanical, was associated with lumbar radiculopathy, and resolved with physical therapy.  Other more peripheral joint arthralgia improved after patient de-escalated weight training regimen.    Right foot pain today I suspect is due to localized bursitis, perhaps exacerbated by a recent lumbar radiculopathy.  -Otherwise the patient has no significant pain today.  ?  Plan:  -Patient will obtain the CD of the right hip x-ray completed  at [facility] in October 2023.  I will review for any evidence of sacroiliitis on the image SI joint.  If negative, then we will have him follow as needed for any acute developments of chronic low back pain, uveitis.  If positive for sacroiliitis, then will have him return to discuss risk/benefits of chronic disease management; consider MRI of SIJ     Rtc prn above    Diagnoses and all orders for this visit:    HLA B27 (HLA B27 positive)    Chronic right-sided low back pain with right-sided sciatica    Right foot pain        No follow-ups on file.      The above plan of care, diagnosis, orders, and follow-up were discussed with the patient. Questions related to this recommended plan of care were answered.    Thank you for referring this delightful patient to me. Please feel free to contact me with any questions.     This report was performed utilizing speech recognition software technology. Despite proofreading, speech recognition errors could escape detection. If a word or phrase is confusing or out of context, please do not hesitate to call for   clarification.       Kind regards      Ila Keene MD  [facility]

## 2024-03-06 PROBLEM — D12.2 BENIGN NEOPLASM OF ASCENDING COLON: Status: ACTIVE | Noted: 2024-03-06

## 2024-03-06 PROBLEM — Z12.11 SPECIAL SCREENING FOR MALIGNANT NEOPLASM OF COLON: Status: ACTIVE | Noted: 2024-03-06

## 2024-03-06 PROBLEM — D12.3 BENIGN NEOPLASM OF TRANSVERSE COLON: Status: ACTIVE | Noted: 2024-03-06

## 2024-10-24 ENCOUNTER — OFFICE VISIT (OUTPATIENT)
Dept: FAMILY MEDICINE CLINIC | Facility: CLINIC | Age: 49
End: 2024-10-24
Payer: COMMERCIAL

## 2024-10-24 VITALS
RESPIRATION RATE: 22 BRPM | SYSTOLIC BLOOD PRESSURE: 122 MMHG | BODY MASS INDEX: 25.81 KG/M2 | WEIGHT: 160.63 LBS | TEMPERATURE: 97 F | OXYGEN SATURATION: 98 % | HEIGHT: 66.14 IN | HEART RATE: 65 BPM | DIASTOLIC BLOOD PRESSURE: 76 MMHG

## 2024-10-24 DIAGNOSIS — Z13.21 SCREENING FOR ENDOCRINE, NUTRITIONAL, METABOLIC AND IMMUNITY DISORDER: ICD-10-CM

## 2024-10-24 DIAGNOSIS — Z13.0 SCREENING FOR ENDOCRINE, NUTRITIONAL, METABOLIC AND IMMUNITY DISORDER: ICD-10-CM

## 2024-10-24 DIAGNOSIS — D72.810 LYMPHOCYTOPENIA: ICD-10-CM

## 2024-10-24 DIAGNOSIS — Z12.5 SCREENING FOR MALIGNANT NEOPLASM OF PROSTATE: ICD-10-CM

## 2024-10-24 DIAGNOSIS — Z13.29 SCREENING FOR ENDOCRINE, NUTRITIONAL, METABOLIC AND IMMUNITY DISORDER: ICD-10-CM

## 2024-10-24 DIAGNOSIS — R45.86 MOOD CHANGES: ICD-10-CM

## 2024-10-24 DIAGNOSIS — E78.00 HYPERCHOLESTEREMIA: ICD-10-CM

## 2024-10-24 DIAGNOSIS — Z00.00 ANNUAL PHYSICAL EXAM: Primary | ICD-10-CM

## 2024-10-24 DIAGNOSIS — Z23 NEED FOR VACCINATION: ICD-10-CM

## 2024-10-24 DIAGNOSIS — Z13.228 SCREENING FOR ENDOCRINE, NUTRITIONAL, METABOLIC AND IMMUNITY DISORDER: ICD-10-CM

## 2024-10-24 DIAGNOSIS — D70.8 OTHER NEUTROPENIA (HCC): ICD-10-CM

## 2024-10-24 PROBLEM — M79.645 FINGER PAIN, LEFT: Status: RESOLVED | Noted: 2023-10-20 | Resolved: 2024-10-24

## 2024-10-24 LAB
ALBUMIN SERPL-MCNC: 4.9 G/DL (ref 3.2–4.8)
ALBUMIN/GLOB SERPL: 1.8 {RATIO} (ref 1–2)
ALP LIVER SERPL-CCNC: 48 U/L
ALT SERPL-CCNC: 34 U/L
ANION GAP SERPL CALC-SCNC: 9 MMOL/L (ref 0–18)
AST SERPL-CCNC: 36 U/L (ref ?–34)
BASOPHILS # BLD AUTO: 0.03 X10(3) UL (ref 0–0.2)
BASOPHILS NFR BLD AUTO: 0.7 %
BILIRUB SERPL-MCNC: 1.4 MG/DL (ref 0.3–1.2)
BUN BLD-MCNC: 20 MG/DL (ref 9–23)
CALCIUM BLD-MCNC: 10 MG/DL (ref 8.7–10.4)
CHLORIDE SERPL-SCNC: 109 MMOL/L (ref 98–112)
CHOLEST SERPL-MCNC: 245 MG/DL (ref ?–200)
CO2 SERPL-SCNC: 26 MMOL/L (ref 21–32)
COMPLEXED PSA SERPL-MCNC: 2.27 NG/ML (ref ?–4)
CREAT BLD-MCNC: 1.25 MG/DL
EGFRCR SERPLBLD CKD-EPI 2021: 70 ML/MIN/1.73M2 (ref 60–?)
EOSINOPHIL # BLD AUTO: 0.07 X10(3) UL (ref 0–0.7)
EOSINOPHIL NFR BLD AUTO: 1.6 %
ERYTHROCYTE [DISTWIDTH] IN BLOOD BY AUTOMATED COUNT: 13.6 %
FASTING PATIENT LIPID ANSWER: YES
FASTING STATUS PATIENT QL REPORTED: YES
GLOBULIN PLAS-MCNC: 2.8 G/DL (ref 2–3.5)
GLUCOSE BLD-MCNC: 89 MG/DL (ref 70–99)
HCT VFR BLD AUTO: 42.9 %
HDLC SERPL-MCNC: 72 MG/DL (ref 40–59)
HGB BLD-MCNC: 14.2 G/DL
IMM GRANULOCYTES # BLD AUTO: 0.01 X10(3) UL (ref 0–1)
IMM GRANULOCYTES NFR BLD: 0.2 %
LDLC SERPL CALC-MCNC: 165 MG/DL (ref ?–100)
LYMPHOCYTES # BLD AUTO: 1.99 X10(3) UL (ref 1–4)
LYMPHOCYTES NFR BLD AUTO: 46.5 %
MCH RBC QN AUTO: 30.4 PG (ref 26–34)
MCHC RBC AUTO-ENTMCNC: 33.1 G/DL (ref 31–37)
MCV RBC AUTO: 91.9 FL
MONOCYTES # BLD AUTO: 0.3 X10(3) UL (ref 0.1–1)
MONOCYTES NFR BLD AUTO: 7 %
NEUTROPHILS # BLD AUTO: 1.88 X10 (3) UL (ref 1.5–7.7)
NEUTROPHILS # BLD AUTO: 1.88 X10(3) UL (ref 1.5–7.7)
NEUTROPHILS NFR BLD AUTO: 44 %
NONHDLC SERPL-MCNC: 173 MG/DL (ref ?–130)
OSMOLALITY SERPL CALC.SUM OF ELEC: 300 MOSM/KG (ref 275–295)
PLATELET # BLD AUTO: 259 10(3)UL (ref 150–450)
POTASSIUM SERPL-SCNC: 4.4 MMOL/L (ref 3.5–5.1)
PROT SERPL-MCNC: 7.7 G/DL (ref 5.7–8.2)
RBC # BLD AUTO: 4.67 X10(6)UL
SODIUM SERPL-SCNC: 144 MMOL/L (ref 136–145)
TRIGL SERPL-MCNC: 53 MG/DL (ref 30–149)
TSI SER-ACNC: 1.4 MIU/ML (ref 0.55–4.78)
VLDLC SERPL CALC-MCNC: 10 MG/DL (ref 0–30)
WBC # BLD AUTO: 4.3 X10(3) UL (ref 4–11)

## 2024-10-24 PROCEDURE — 85025 COMPLETE CBC W/AUTO DIFF WBC: CPT | Performed by: FAMILY MEDICINE

## 2024-10-24 PROCEDURE — 84403 ASSAY OF TOTAL TESTOSTERONE: CPT | Performed by: FAMILY MEDICINE

## 2024-10-24 PROCEDURE — 80061 LIPID PANEL: CPT | Performed by: FAMILY MEDICINE

## 2024-10-24 PROCEDURE — 80053 COMPREHEN METABOLIC PANEL: CPT | Performed by: FAMILY MEDICINE

## 2024-10-24 PROCEDURE — 84443 ASSAY THYROID STIM HORMONE: CPT | Performed by: FAMILY MEDICINE

## 2024-10-24 PROCEDURE — 84402 ASSAY OF FREE TESTOSTERONE: CPT | Performed by: FAMILY MEDICINE

## 2024-10-24 RX ORDER — AMOXICILLIN 875 MG/1
875 TABLET, COATED ORAL 2 TIMES DAILY
COMMUNITY
Start: 2024-05-02 | End: 2024-10-24

## 2024-10-24 RX ORDER — CHLORHEXIDINE GLUCONATE ORAL RINSE 1.2 MG/ML
SOLUTION DENTAL
COMMUNITY
Start: 2024-05-02 | End: 2024-10-24

## 2024-10-24 RX ORDER — PREDNISONE 20 MG/1
40 TABLET ORAL EVERY MORNING
COMMUNITY
Start: 2024-07-30 | End: 2024-10-24

## 2024-10-24 RX ORDER — IBUPROFEN 600 MG/1
600 TABLET, FILM COATED ORAL 3 TIMES DAILY
COMMUNITY
Start: 2024-05-02 | End: 2024-10-24

## 2024-10-24 RX ORDER — BENZONATATE 200 MG/1
CAPSULE ORAL
COMMUNITY
Start: 2024-07-30 | End: 2024-10-24

## 2024-10-24 NOTE — PROGRESS NOTES
Patient came in for draw of ordered fasting labs. Patient drawn out of right arm  AC, x 1 attempt and tolerated well.  1 SST ( green) 2 SST (gold ) 1 Lavender  tube drawn.

## 2024-10-24 NOTE — PATIENT INSTRUCTIONS
Perform labs fasting 8 hours with water or black coffee or or black tea diet  soda only prior to exam.    -Encourage healthy diet of whole food and avoid processed food and sugary drinks and sodas.  Diet should include lean meats and vegetables including 5-7 servings of fruit and vegetables total in 1 day.  Never skip breakfast.  -Encouraged exercise 30 minutes or more 5 times weekly to prevent obesity and chronic disease and eliminate stress and its effect on the body. Total 150mins weekly or more.  -encouraged to continue not smoking  - recommend condom use per CDC recommendation for all  or unmarried couples  -  immunizations- annual influenza vaccine recommended  -Vitamin D3 1000- 2000 units daily recommended  -Needs 1000mg of calcium daily for osteoporosis prevention discussed. Need to ingest 1000mg of calcium daily to prevent osteoporosis later in life.

## 2024-10-24 NOTE — PROGRESS NOTES
Chief Complaint   Patient presents with    Physical     Annual exam       REASON FOR VISIT:    Bo Claudio is a 49 year old male who presents for an Annual Health Assessment.    History of back pain. States resolved. Hx of HLAB27 measurment    History of mood changes- resolved now. Wants testosterone evaluated. No loss of libido or ED or fatigue    , monogamous  On vit D daily + MVI- yes  Calcium- not monitoring  Colonoscopy- 3/26/2024 benign polyps recall in 7 years  PSA: 10/20/2023  2.75 NG per mL.  Denies nocturia including water prior to bedtime.  No change in stream.  No family history of prostate cancer  Exercise 5x weekly 1 hour or more  Diet- multiple greens, lean meat, eggs, no processed food  Dentist regularly- yes  Annual eye exam-yes  Etoh: 6 drinks per month- red wine, vodka  Cigs: never  Immunizations: Refusing vaccinations today.  Does not want to update his pneumonia, flu or Shingrix.  FH significant: reviewed, denies cancer  Family History   Problem Relation Age of Onset    No Known Problems Father     No Known Problems Mother     No Known Problems Son     No Known Problems Son     Heart Attack Maternal Grandmother     Stroke Maternal Grandmother     Diabetes Maternal Grandfather     No Known Problems Sister     No Known Problems Brother     No Known Problems Brother    '      Patient Active Problem List   Diagnosis    Hyperhidrosis of palms and soles    Hypercholesteremia    Lymphocytopenia    Other neutropenia (HCC)    BMI 25.0-25.9,adult    Right lumbar radiculopathy    Elevated ALT measurement    HLA B27 (HLA B27 positive)    History of back pain    Special screening for malignant neoplasm of colon    Benign neoplasm of ascending colon    Benign neoplasm of transverse colon     Current Outpatient Medications   Medication Sig Dispense Refill    Calcium Carbonate Antacid 1000 MG Oral Chew Tab Chew 1,000 mg by mouth daily.      omega-3 fatty acids 1000 MG Oral Cap Take 900 mg by mouth  daily.      Cholecalciferol (VITAMIN D3) 25 MCG (1000 UT) Oral Cap Take 1 tablet by mouth daily.      Multiple Vitamins-Minerals (JORGE MULTI MEN OR) 2 scoops in with fruits and vegetables once daily        Wt Readings from Last 6 Encounters:   10/24/24 160 lb 9.6 oz (72.8 kg)   02/12/24 167 lb 12.8 oz (76.1 kg)   02/08/24 163 lb (73.9 kg)   12/19/23 167 lb (75.8 kg)   10/20/23 172 lb 9.6 oz (78.3 kg)   04/18/22 179 lb 9.6 oz (81.5 kg)     Body mass index is 25.81 kg/m².    No results found for: \"GLUCOSE\"  Lab Results   Component Value Date    CHOLEST 253 (H) 10/20/2023    CHOLEST 285 (H) 01/22/2021    CHOLEST 227 (H) 08/03/2018     Lab Results   Component Value Date    HDL 64 (H) 10/20/2023    HDL 65 (H) 01/22/2021    HDL 62 (H) 08/03/2018     No results found for: \"TRIGLY\"  Lab Results   Component Value Date     (H) 10/20/2023     (H) 01/22/2021     (H) 08/03/2018     Lab Results   Component Value Date    AST 19 12/19/2023    AST 34 10/20/2023    AST 19 01/22/2021     Lab Results   Component Value Date    ALT 36 12/19/2023    ALT 81 (H) 10/20/2023    ALT 34 01/22/2021     Lab Results   Component Value Date    TSH 1.100 10/20/2023    TSH 1.250 01/22/2021    TSH 1.030 08/03/2018     Lab Results   Component Value Date    BUN 18 10/20/2023    BUN 16 01/22/2021    BUN 10 08/03/2018    CREATSERUM 1.48 (H) 10/20/2023    CREATSERUM 1.56 (H) 01/22/2021    CREATSERUM 1.23 08/03/2018     No results found for: \"PSA\"    General Health     How would you describe your current health state?: Good    Type of Diet: Balanced    How do you maintain positive mental well-being?: Visiting Family;Visiting Friends    How would you describe your daily physical activity?: Heavy    If you are a male age 45-79 or a female age 55-79, do you take aspirin?: No    Have you had any immunizations at another office such as Influenza, Hepatitis B, Tetanus, or Pneumococcal?: No    At any time do you feel concerned for the  safety/well-being of yourself and/or your children, in your home or elsewhere?: No     CAGE:     Cut: Have you ever felt you should Cut down on your drinking?: No    Annoyed: Have people Annoyed you by criticizing your drinking?: No    Guilty: Have you ever felt bad or Guilty about your drinking?: No    Eye Opener: Have you ever had a drink first thing in the morning to steady your nerves or to get rid of a hangover (Eye opener)?: No    Scoring  Total Score: 0     Depression Screening (PHQ-2/PHQ-9): Over the LAST 2 WEEKS   Little interest or pleasure in doing things (over the last two weeks)?: Not at all    Feeling down, depressed, or hopeless (over the last two weeks)?: Not at all    PHQ-2 SCORE: 0   Son committed suicide 3 to 4 years ago.  Does not feel needs medication.  Doing well.  But suffering with low back pain believes contributed to his lack of interest in doing things.     PREVENTATIVE SERVICES  INDICATIONS AND SCHEDULE Recommendation Internal Lab or Procedure External Lab or Procedure   Colonoscopy Screen Every 10 years Health Maintenance   Topic Date Due    Colorectal Cancer Screening  03/06/2031       Flex Sigmoidoscopy Screen  Every 5 years No results found for this or any previous visit.    Fecal Occult Blood  Annually No results found for: \"FOB\", \"OCCULTSTOOL\"    Obesity Screening Screen all adults annually Body mass index is 25.81 kg/m².      Preventive Services for Which Recommendations Vary with Risk Recommendation Internal Lab or Procedure External Lab or Procedure   Cholesterol Screening Recommended screening varies with age, risk and gender LDL Cholesterol (mg/dL)   Date Value   10/20/2023 164 (H)       Diabetes Screening  If history of high blood pressure or other  risk factors HgbA1C (%)   Date Value   10/20/2023 5.6     Glucose (mg/dL)   Date Value   10/20/2023 92         Gonorrhea Screening If high risk No results found for: \"GONOCOCCUS\"    HIV Screening For all adults age 18-65, older  adults at increased risk No results found for: \"HIV\"    Syphilis Screening Screen if pregnant or high risk No results found for: \"RPR\"    Hepatitis C Screening Screen those at high risk plus screen one time for adults born 1946-1 965 No results found for: \"HCVAB\"    Tuberculosis Screen If high risk No components found for: \"PPDINDURAT\"      Disease Monitoring:    SPECIFIC DISEASE MONITORING Internal Lab or Procedure External Lab or Procedure   Annual Monitoring of Persistent     Medications (ACE/ARB, digoxin, diuretics)    Potassium  Annually Potassium (mmol/L)   Date Value   10/20/2023 4.6         No data to display                Creatinine  Annually Creatinine (mg/dL)   Date Value   10/20/2023 1.48 (H)         No data to display                Digoxin Serum Conc  Annually No results found for: \"DIGOXIN\"      No data to display                Diabetes      HgbA1C  Annually HgbA1C (%)   Date Value   10/20/2023 5.6         No data to display                Creat/alb ratio  Annually Creatinine (mg/dL)   Date Value   10/20/2023 1.48 (H)        LDL  Annually LDL Cholesterol (mg/dL)   Date Value   10/20/2023 164 (H)         No data to display                 Dilated Eye exam  Annually      No data to display                   No data to display                Asthma  (Annually between Nov. 1 & Dec. 31)    Date of last AAP/ACT and counseling given on importance of controller meds.                 ALLERGIES:   No Known Allergies    CURRENT MEDICATIONS:   Current Outpatient Medications   Medication Sig Dispense Refill    Calcium Carbonate Antacid 1000 MG Oral Chew Tab Chew 1,000 mg by mouth daily.      omega-3 fatty acids 1000 MG Oral Cap Take 900 mg by mouth daily.      Cholecalciferol (VITAMIN D3) 25 MCG (1000 UT) Oral Cap Take 1 tablet by mouth daily.      Multiple Vitamins-Minerals (JORGE MULTI MEN OR) 2 scoops in with fruits and vegetables once daily         MEDICAL INFORMATION:   Past Medical History:    Arthritis     Bloating    Decorative tattoo    Paresthesias    3/2018 bilateral hands and left foot 4/2/2018-changed sleep position to back and not putting arms overhead and almost completely resolved except for some tingling in the right hand.  He notices this upon awakening and resolves in a few minutes after grasping a few time.  Not using wrist splints.  Ice weakness    Wears glasses      Past Surgical History:   Procedure Laterality Date    Colonoscopy  02/2024    Vasectomy      2009      Family History   Problem Relation Age of Onset    No Known Problems Father     No Known Problems Mother     No Known Problems Son     No Known Problems Son     Heart Attack Maternal Grandmother     Stroke Maternal Grandmother     Diabetes Maternal Grandfather     No Known Problems Sister     No Known Problems Brother     No Known Problems Brother       SOCIAL HISTORY:   Social History     Socioeconomic History    Marital status:    Tobacco Use    Smoking status: Former     Types: Cigarettes    Smokeless tobacco: Never    Tobacco comments:     Occasional cigar   Vaping Use    Vaping status: Never Used   Substance and Sexual Activity    Alcohol use: Yes     Alcohol/week: 1.0 standard drink of alcohol     Types: 1 Glasses of wine per week     Comment: Occasionally    Drug use: Never   Other Topics Concern    Caffeine Concern No    Stress Concern No    Weight Concern No    Special Diet No    Exercise Yes    Seat Belt Yes     Social Drivers of Health      Received [facility], [facility]    Housing Stability     Occ: Owns spa  : yes       REVIEW OF SYSTEMS:   GENERAL: feels well otherwise  SKIN: denies any unusual skin lesions  EYES: denies blurred vision or double vision  HEENT: denies nasal congestion, sinus pain or ST  LUNGS: denies shortness of breath with exertion  CARDIOVASCULAR: denies chest pain on exertion  GI: denies abdominal pain, denies heartburn  : denies nocturia or  changes in stream  MUSCULOSKELETAL: Right shoulder pain,  denies back pain  NEURO: denies headaches  PSYCHE: denies depression or anxiety  HEMATOLOGIC: denies hx of anemia  ENDOCRINE: denies thyroid history  ALL/ASTHMA: denies hx of allergy or asthma    EXAM:   Temp 97.3 °F (36.3 °C) (Temporal)   Resp 22   Ht 5' 6.14\" (1.68 m)   Wt 160 lb 9.6 oz (72.8 kg)   BMI 25.81 kg/m²   GENERAL: well developed, well nourished, in no apparent distress, physically fit not overweight  SKIN: no rashes, no suspicious lesions  HEENT: atraumatic, normocephalic, ears clear bilateral, wearing mask  EYES:PERRLA, EOMI, normal optic disk, conjunctiva are clear  NECK: supple, no adenopathy, no bruits  CHEST: no chest tenderness  LUNGS: clear to auscultation  CARDIO: RRR without murmur  GI: good BS's, no masses, HSM or tenderness  : two descended testes, no masses, no hernia, no penile lesions  RECTAL: deferred  MUSCULOSKELETAL: back is not tender, FROM of the back, negative SLR bilateral   Grasp strength is +5 bilateral.  Upper/lower extremity muscle strength is plus 5 out of 5 bilateral equal symmetric.   EXTREMITIES: no cyanosis, clubbing or edema  NEURO: Oriented times three, cranial nerves are intact, motor and sensory are grossly intact.  All DTRs are +1/4 bilateral equal and symmetric.  Sensation is intact to touch.    ASSESSMENT AND OTHER RELEVANT CHRONIC CONDITIONS:   Bo Claudio is a 49 year old male who presents for an Annual Health Assessment.     PLAN SUMMARY:   1. Annual physical exam  -Encourage Mediterranean diet  -Encouraged exercise 30 minutes to 60 minutes 3-5 times  weekly to prevent obesity and chronic disease and eliminate stress and its effect on the body.  -encouraged to continue not smoking  - recommend condom use--safe sex practices discussed- CDC recommendation for all  or unmarried couples  - immunizations-refused all vaccines.  Recommend annual flu shot and COVID-19 booster at local pharmacy every  fall  -Vitamin D3 2000 units daily recommended  -Needs 1000 mg of calcium daily for osteoporosis prevention discussed  - CBC With Differential With Platelet; Future  - Comp Metabolic Panel; Future  - Lipid Panel; Future  - TSH W Reflex To Free T4; Future  - PSA, Total (Screening) [E]; Future    2. Hypercholesteremia  - Lipid Panel; Future  The patient's ASCVD 10 year risk score is 3.3.  encourage mediterranean diet  Exercise brisk walk 30-60 mins at least 5 days weekly  Lose weight if overweight  Recheck fasting labs annually to monitor lifestyle control    3. Other neutropenia (HCC)  5. Lymphocytopenia  - CBC With Differential With Platelet; Future    4. BMI 25.0-25.9,adult    6. Mood changes  - Testosterone, Total Free, Male [E]; Future  Denies loss of libido, ED, fatigue  Wants to be evaluated    7. Need for vaccination  - Influenza Refused    8. Screening for malignant neoplasm of prostate  - PSA, Total (Screening) [E]; Future    9. Screening for endocrine, nutritional, metabolic and immunity disorder  - CBC With Differential With Platelet; Future  - Comp Metabolic Panel; Future  - TSH W Reflex To Free T4; Future      The patient indicates understanding of these issues and agrees to the plan.  Return in about 1 year (around 10/24/2025) for annual physical, discuss labs.    Diet counseling perfomed  Exercise counseling perfomed  STI Prevention counseling perfomed    SUGGESTED VACCINATIONS - Influenza, Pneumococcal, Zoster, Tetanus     Immunization History   Administered Date(s) Administered    Covid-19 Vaccine Moderna 100 mcg/0.5 ml 03/02/2021, 03/31/2021    Pneumovax 23 04/18/2022    TDAP 04/18/2022    Varicella Deferred (Had Chicken Pox) 08/30/1981       Influenza Annually   Pneumococcal if high risk   Td/Tdap once then every 10 years   HPV Males 11-21   Zoster (Shingles) 60 and older: one dose   Varicella 2 doses if not immune   MMR 1-2 doses if born after 1957 and not immune

## 2024-10-28 LAB
FREE TESTOST DIRECT: 7.3 PG/ML
TESTOSTERONE: 372 NG/DL

## (undated) NOTE — LETTER
Date: 11/3/2021    Patient Name: Noé Parr          To Whom it may concern: This letter has been written at the patient's request. The above patient was seen at the [facility] for treatment of a medical condition.     This patient shoul

## (undated) NOTE — LETTER
05/27/21        [address] [place] [address] [place] [facility] [address]      Dear Fabi Marquez,    [facility] records indicate that you have outstanding lab work and or testing that was ordered for you and has not yet been completed:  Orders Placed This Encounter